# Patient Record
Sex: MALE | Race: WHITE | NOT HISPANIC OR LATINO | Employment: OTHER | URBAN - METROPOLITAN AREA
[De-identification: names, ages, dates, MRNs, and addresses within clinical notes are randomized per-mention and may not be internally consistent; named-entity substitution may affect disease eponyms.]

---

## 2024-07-25 ENCOUNTER — OFFICE VISIT (OUTPATIENT)
Dept: FAMILY MEDICINE CLINIC | Facility: CLINIC | Age: 79
End: 2024-07-25
Payer: MEDICARE

## 2024-07-25 VITALS
HEART RATE: 78 BPM | TEMPERATURE: 97.5 F | DIASTOLIC BLOOD PRESSURE: 78 MMHG | WEIGHT: 184 LBS | OXYGEN SATURATION: 98 % | SYSTOLIC BLOOD PRESSURE: 128 MMHG | BODY MASS INDEX: 26.34 KG/M2 | HEIGHT: 70 IN

## 2024-07-25 DIAGNOSIS — Z00.00 ANNUAL PHYSICAL EXAM: Primary | ICD-10-CM

## 2024-07-25 DIAGNOSIS — M79.671 PAIN IN BOTH FEET: ICD-10-CM

## 2024-07-25 DIAGNOSIS — M79.672 PAIN IN BOTH FEET: ICD-10-CM

## 2024-07-25 DIAGNOSIS — Z79.4 TYPE 2 DIABETES MELLITUS WITH HYPERGLYCEMIA, WITH LONG-TERM CURRENT USE OF INSULIN: ICD-10-CM

## 2024-07-25 DIAGNOSIS — I25.10 CORONARY ARTERY DISEASE INVOLVING NATIVE CORONARY ARTERY OF NATIVE HEART WITHOUT ANGINA PECTORIS: ICD-10-CM

## 2024-07-25 DIAGNOSIS — Z87.891 PERSONAL HISTORY OF TOBACCO USE, PRESENTING HAZARDS TO HEALTH: ICD-10-CM

## 2024-07-25 DIAGNOSIS — E78.2 MIXED HYPERLIPIDEMIA: ICD-10-CM

## 2024-07-25 DIAGNOSIS — I10 PRIMARY HYPERTENSION: ICD-10-CM

## 2024-07-25 DIAGNOSIS — J43.9 PULMONARY EMPHYSEMA, UNSPECIFIED EMPHYSEMA TYPE: ICD-10-CM

## 2024-07-25 DIAGNOSIS — E11.65 TYPE 2 DIABETES MELLITUS WITH HYPERGLYCEMIA, WITH LONG-TERM CURRENT USE OF INSULIN: ICD-10-CM

## 2024-07-25 DIAGNOSIS — E53.8 VITAMIN B12 DEFICIENCY: ICD-10-CM

## 2024-07-25 PROBLEM — N40.0 BENIGN PROSTATIC HYPERPLASIA WITHOUT LOWER URINARY TRACT SYMPTOMS: Status: ACTIVE | Noted: 2024-07-25

## 2024-07-25 PROBLEM — M79.2 NEUROPATHIC PAIN: Status: ACTIVE | Noted: 2024-07-25

## 2024-07-25 PROBLEM — J44.9 COPD (CHRONIC OBSTRUCTIVE PULMONARY DISEASE): Status: ACTIVE | Noted: 2024-07-25

## 2024-07-25 LAB
ALBUMIN SERPL-MCNC: 4 G/DL (ref 3.5–5.2)
ALBUMIN UR-MCNC: <1.2 MG/DL
ALBUMIN/GLOB SERPL: 1.5 G/DL
ALP SERPL-CCNC: 70 U/L (ref 39–117)
ALT SERPL W P-5'-P-CCNC: 27 U/L (ref 1–41)
ANION GAP SERPL CALCULATED.3IONS-SCNC: 11.4 MMOL/L (ref 5–15)
AST SERPL-CCNC: 25 U/L (ref 1–40)
BASOPHILS # BLD AUTO: 0.05 10*3/MM3 (ref 0–0.2)
BASOPHILS NFR BLD AUTO: 0.5 % (ref 0–1.5)
BILIRUB SERPL-MCNC: 0.4 MG/DL (ref 0–1.2)
BUN SERPL-MCNC: 13 MG/DL (ref 8–23)
BUN/CREAT SERPL: 15.1 (ref 7–25)
CALCIUM SPEC-SCNC: 8.5 MG/DL (ref 8.6–10.5)
CHLORIDE SERPL-SCNC: 106 MMOL/L (ref 98–107)
CHOLEST SERPL-MCNC: 103 MG/DL (ref 0–200)
CO2 SERPL-SCNC: 23.6 MMOL/L (ref 22–29)
CREAT SERPL-MCNC: 0.86 MG/DL (ref 0.76–1.27)
CREAT UR-MCNC: 87.4 MG/DL
DEPRECATED RDW RBC AUTO: 48.1 FL (ref 37–54)
EGFRCR SERPLBLD CKD-EPI 2021: 89.7 ML/MIN/1.73
EOSINOPHIL # BLD AUTO: 0.15 10*3/MM3 (ref 0–0.4)
EOSINOPHIL NFR BLD AUTO: 1.4 % (ref 0.3–6.2)
ERYTHROCYTE [DISTWIDTH] IN BLOOD BY AUTOMATED COUNT: 13.2 % (ref 12.3–15.4)
GLOBULIN UR ELPH-MCNC: 2.6 GM/DL
GLUCOSE SERPL-MCNC: 121 MG/DL (ref 65–99)
HBA1C MFR BLD: 6.3 % (ref 4.8–5.6)
HCT VFR BLD AUTO: 47.8 % (ref 37.5–51)
HCV AB SER QL: NORMAL
HDLC SERPL-MCNC: 48 MG/DL (ref 40–60)
HGB BLD-MCNC: 16 G/DL (ref 13–17.7)
IMM GRANULOCYTES # BLD AUTO: 0.04 10*3/MM3 (ref 0–0.05)
IMM GRANULOCYTES NFR BLD AUTO: 0.4 % (ref 0–0.5)
LDLC SERPL CALC-MCNC: 42 MG/DL (ref 0–100)
LDLC/HDLC SERPL: 0.92 {RATIO}
LYMPHOCYTES # BLD AUTO: 1.56 10*3/MM3 (ref 0.7–3.1)
LYMPHOCYTES NFR BLD AUTO: 15 % (ref 19.6–45.3)
MCH RBC QN AUTO: 33.5 PG (ref 26.6–33)
MCHC RBC AUTO-ENTMCNC: 33.5 G/DL (ref 31.5–35.7)
MCV RBC AUTO: 100 FL (ref 79–97)
MICROALBUMIN/CREAT UR: NORMAL MG/G{CREAT}
MONOCYTES # BLD AUTO: 0.84 10*3/MM3 (ref 0.1–0.9)
MONOCYTES NFR BLD AUTO: 8.1 % (ref 5–12)
NEUTROPHILS NFR BLD AUTO: 7.75 10*3/MM3 (ref 1.7–7)
NEUTROPHILS NFR BLD AUTO: 74.6 % (ref 42.7–76)
NRBC BLD AUTO-RTO: 0 /100 WBC (ref 0–0.2)
PLATELET # BLD AUTO: 150 10*3/MM3 (ref 140–450)
PMV BLD AUTO: 10.9 FL (ref 6–12)
POTASSIUM SERPL-SCNC: 4.2 MMOL/L (ref 3.5–5.2)
PROT SERPL-MCNC: 6.6 G/DL (ref 6–8.5)
RBC # BLD AUTO: 4.78 10*6/MM3 (ref 4.14–5.8)
SODIUM SERPL-SCNC: 141 MMOL/L (ref 136–145)
TRIGL SERPL-MCNC: 54 MG/DL (ref 0–150)
TSH SERPL DL<=0.05 MIU/L-ACNC: 0.88 UIU/ML (ref 0.27–4.2)
VIT B12 BLD-MCNC: 530 PG/ML (ref 211–946)
VLDLC SERPL-MCNC: 13 MG/DL (ref 5–40)
WBC NRBC COR # BLD AUTO: 10.39 10*3/MM3 (ref 3.4–10.8)

## 2024-07-25 PROCEDURE — 80061 LIPID PANEL: CPT | Performed by: FAMILY MEDICINE

## 2024-07-25 PROCEDURE — G0439 PPPS, SUBSEQ VISIT: HCPCS | Performed by: FAMILY MEDICINE

## 2024-07-25 PROCEDURE — 86803 HEPATITIS C AB TEST: CPT | Performed by: FAMILY MEDICINE

## 2024-07-25 PROCEDURE — 3078F DIAST BP <80 MM HG: CPT | Performed by: FAMILY MEDICINE

## 2024-07-25 PROCEDURE — 82570 ASSAY OF URINE CREATININE: CPT | Performed by: FAMILY MEDICINE

## 2024-07-25 PROCEDURE — 1160F RVW MEDS BY RX/DR IN RCRD: CPT | Performed by: FAMILY MEDICINE

## 2024-07-25 PROCEDURE — 85025 COMPLETE CBC W/AUTO DIFF WBC: CPT | Performed by: FAMILY MEDICINE

## 2024-07-25 PROCEDURE — 1159F MED LIST DOCD IN RCRD: CPT | Performed by: FAMILY MEDICINE

## 2024-07-25 PROCEDURE — 83036 HEMOGLOBIN GLYCOSYLATED A1C: CPT | Performed by: FAMILY MEDICINE

## 2024-07-25 PROCEDURE — 36415 COLL VENOUS BLD VENIPUNCTURE: CPT | Performed by: FAMILY MEDICINE

## 2024-07-25 PROCEDURE — 82043 UR ALBUMIN QUANTITATIVE: CPT | Performed by: FAMILY MEDICINE

## 2024-07-25 PROCEDURE — 1170F FXNL STATUS ASSESSED: CPT | Performed by: FAMILY MEDICINE

## 2024-07-25 PROCEDURE — 80053 COMPREHEN METABOLIC PANEL: CPT | Performed by: FAMILY MEDICINE

## 2024-07-25 PROCEDURE — 1125F AMNT PAIN NOTED PAIN PRSNT: CPT | Performed by: FAMILY MEDICINE

## 2024-07-25 PROCEDURE — 82607 VITAMIN B-12: CPT | Performed by: FAMILY MEDICINE

## 2024-07-25 PROCEDURE — 84443 ASSAY THYROID STIM HORMONE: CPT | Performed by: FAMILY MEDICINE

## 2024-07-25 PROCEDURE — 3074F SYST BP LT 130 MM HG: CPT | Performed by: FAMILY MEDICINE

## 2024-07-25 RX ORDER — INSULIN DEGLUDEC 100 U/ML
INJECTION, SOLUTION SUBCUTANEOUS
COMMUNITY
Start: 2024-06-04

## 2024-07-25 RX ORDER — CYANOCOBALAMIN 1000 UG/ML
INJECTION, SOLUTION INTRAMUSCULAR; SUBCUTANEOUS
COMMUNITY
Start: 2024-06-11

## 2024-07-25 RX ORDER — PEN NEEDLE, DIABETIC 32GX 5/32"
NEEDLE, DISPOSABLE MISCELLANEOUS
COMMUNITY
Start: 2024-05-17

## 2024-07-25 RX ORDER — SACUBITRIL AND VALSARTAN 97; 103 MG/1; MG/1
TABLET, FILM COATED ORAL
COMMUNITY
Start: 2024-06-25

## 2024-07-25 RX ORDER — BLOOD-GLUCOSE METER
KIT MISCELLANEOUS
COMMUNITY
Start: 2024-06-24

## 2024-07-25 RX ORDER — FINASTERIDE 5 MG/1
TABLET, FILM COATED ORAL
COMMUNITY
Start: 2024-06-17

## 2024-07-25 RX ORDER — ATORVASTATIN CALCIUM 80 MG/1
TABLET, FILM COATED ORAL
COMMUNITY
Start: 2024-04-29

## 2024-07-25 RX ORDER — FLUTICASONE FUROATE, UMECLIDINIUM BROMIDE AND VILANTEROL TRIFENATATE 100; 62.5; 25 UG/1; UG/1; UG/1
POWDER RESPIRATORY (INHALATION)
COMMUNITY
Start: 2024-06-30

## 2024-07-25 RX ORDER — EMPAGLIFLOZIN 25 MG/1
TABLET, FILM COATED ORAL
COMMUNITY
Start: 2024-06-03

## 2024-07-25 RX ORDER — SEMAGLUTIDE 1.34 MG/ML
INJECTION, SOLUTION SUBCUTANEOUS
COMMUNITY
Start: 2024-05-24

## 2024-07-25 RX ORDER — OXYBUTYNIN CHLORIDE 5 MG/1
TABLET, EXTENDED RELEASE ORAL
COMMUNITY
Start: 2024-07-20

## 2024-07-25 RX ORDER — CLOPIDOGREL BISULFATE 75 MG/1
TABLET ORAL
COMMUNITY
Start: 2024-05-24

## 2024-07-25 RX ORDER — GABAPENTIN 300 MG/1
CAPSULE ORAL
COMMUNITY
Start: 2024-04-16

## 2024-07-25 RX ORDER — TAMSULOSIN HYDROCHLORIDE 0.4 MG/1
CAPSULE ORAL
COMMUNITY
Start: 2024-06-01

## 2024-07-25 RX ORDER — LANCETS 28 GAUGE
EACH MISCELLANEOUS
COMMUNITY
Start: 2024-06-24

## 2024-07-25 RX ORDER — METOPROLOL SUCCINATE 100 MG/1
TABLET, EXTENDED RELEASE ORAL
COMMUNITY
Start: 2024-06-17

## 2024-07-25 RX ORDER — ALBUTEROL SULFATE 90 UG/1
AEROSOL, METERED RESPIRATORY (INHALATION)
COMMUNITY
Start: 2024-06-03

## 2024-07-25 NOTE — PROGRESS NOTES
"Chief Complaint  Establish Care (He forgot his medicine at home on the counter.  He is going to try to verbally give them to me. ) and Diabetes    Subjective      Josemanuel De La Fuente is a 78 y.o. male who presents to [facility]     Patient here to establish care.    PMH: Hypertension, hyperlipidemia, type 2 diabetes, BPH, neuropathy, COPD, vitamin B12 deficiency  SH:         Objective   Vital Signs:   Vitals:    07/25/24 1605   Pulse: 78   Temp: 97.5 °F (36.4 °C)   TempSrc: Temporal   SpO2: 98%   Weight: 83.5 kg (184 lb)   Height: 178.4 cm (70.25\")   PainSc:   6     Body mass index is 26.21 kg/m².    Wt Readings from Last 3 Encounters:   07/25/24 83.5 kg (184 lb)     BP Readings from Last 3 Encounters:   No data found for BP       Health Maintenance   Topic Date Due    TDAP/TD VACCINES (1 - Tdap) Never done    ZOSTER VACCINE (1 of 2) Never done    RSV Vaccine - Adults (1 - 1-dose 60+ series) Never done    Pneumococcal Vaccine 65+ (1 of 1 - PCV) Never done    COVID-19 Vaccine (1 - 2023-24 season) Never done    HEPATITIS C SCREENING  Never done    ANNUAL PHYSICAL  Never done    INFLUENZA VACCINE  08/01/2024       Physical Exam     Result Review :  The following data was reviewed by: Tino Roach MD on 07/25/2024:  {[facility] Ambulatory Labs (Optional):13646}  {Data reviewed (Optional):43487:::1}     Procedures     {DME w/ Necessity Statements (Optional):67966}     Assessment & Plan  Annual physical exam               {BMI is >= 25 and <30. (Overweight) The following options were offered after discussion;:[id number]}       {Time Spent (Optional):00787}  FOLLOW UP  No follow-ups on file.  Patient was given instructions and counseling regarding his condition or for health maintenance advice. Please see specific information pulled into the AVS if appropriate.       Tino Roach MD  07/25/24  16:07 EDT    CURRENT & DISCONTINUED MEDICATIONS  No current outpatient " medications    There are no discontinued medications.        The patient is a 38y Female complaining of abdominal pain.

## 2024-07-25 NOTE — PATIENT INSTRUCTIONS
Please review the decision aid used during our discussion regarding the Low dose lung cancer screening visit today.                          For more information:    Quit Now [place]  1-800-QUIT-NOW  https://[place].quitlogix.org/en-[place]/  Steps to Quit Smoking  Smoking tobacco can be harmful to your health and can affect almost every organ in your body. Smoking puts you, and those around you, at risk for developing many serious chronic diseases. Quitting smoking is difficult, but it is one of the best things that you can do for your health. It is never too late to quit.  What are the benefits of quitting smoking?  When you quit smoking, you lower your risk of developing serious diseases and conditions, such as:  Lung cancer or lung disease, such as COPD.  Heart disease.  Stroke.  Heart attack.  Infertility.  Osteoporosis and bone fractures.  Additionally, symptoms such as coughing, wheezing, and shortness of breath may get better when you quit. You may also find that you get sick less often because your body is stronger at fighting off colds and infections. If you are pregnant, quitting smoking can help to reduce your chances of having a baby of low birth weight.  How do I get ready to quit?  When you decide to quit smoking, create a plan to make sure that you are successful. Before you quit:  Pick a date to quit. Set a date within the next two weeks to give you time to prepare.  Write down the reasons why you are quitting. Keep this list in places where you will see it often, such as on your bathroom mirror or in your car or wallet.  Identify the people, places, things, and activities that make you want to smoke (triggers) and avoid them. Make sure to take these actions:  Throw away all cigarettes at home, at work, and in your car.  Throw away smoking accessories, such as ashtrays and lighters.  Clean your car and make sure to empty the ashtray.  Clean your home, including curtains and carpets.  Tell your family,  friends, and coworkers that you are quitting. Support from your loved ones can make quitting easier.  Talk with your health care provider about your options for quitting smoking.  Find out what treatment options are covered by your health insurance.  What strategies can I use to quit smoking?  Talk with your healthcare provider about different strategies to quit smoking. Some strategies include:  Quitting smoking altogether instead of gradually lessening how much you smoke over a period of time. Research shows that quitting “cold turkey” is more successful than gradually quitting.  Attending in-person counseling to help you build problem-solving skills. You are more likely to have success in quitting if you attend several counseling sessions. Even short sessions of 10 minutes can be effective.  Finding resources and support systems that can help you to quit smoking and remain smoke-free after you quit. These resources are most helpful when you use them often. They can include:  Online chats with a counselor.  Telephone quitlines.  Printed self-help materials.  Support groups or group counseling.  Text messaging programs.  Mobile phone applications.  Taking medicines to help you quit smoking. (If you are pregnant or breastfeeding, talk with your health care provider first.) Some medicines contain nicotine and some do not. Both types of medicines help with cravings, but the medicines that include nicotine help to relieve withdrawal symptoms. Your health care provider may recommend:  Nicotine patches, gum, or lozenges.  Nicotine inhalers or sprays.  Non-nicotine medicine that is taken by mouth.  Talk with your health care provider about combining strategies, such as taking medicines while you are also receiving in-person counseling. Using these two strategies together makes you more likely to succeed in quitting than if you used either strategy on its own.  If you are pregnant or breastfeeding, talk with your health  care provider about finding counseling or other support strategies to quit smoking. Do not take medicine to help you quit smoking unless told to do so by your health care provider.  What things can I do to make it easier to quit?  Quitting smoking might feel overwhelming at first, but there is a lot that you can do to make it easier. Take these important actions:  Reach out to your family and friends and ask that they support and encourage you during this time. Call telephone quitlines, reach out to support groups, or work with a counselor for support.  Ask people who smoke to avoid smoking around you.  Avoid places that trigger you to smoke, such as bars, parties, or smoke-break areas at work.  Spend time around people who do not smoke.  Lessen stress in your life, because stress can be a smoking trigger for some people. To lessen stress, try:  Exercising regularly.  Deep-breathing exercises.  Yoga.  Meditating.  Performing a body scan. This involves closing your eyes, scanning your body from head to toe, and noticing which parts of your body are particularly tense. Purposefully relax the muscles in those areas.  Download or purchase mobile phone or tablet apps (applications) that can help you stick to your quit plan by providing reminders, tips, and encouragement. There are many free apps, such as QuitGuide from the CDC (Centers for Disease Control and Prevention). You can find other support for quitting smoking (smoking cessation) through smokefree.gov and other websites.  How will I feel when I quit smoking?  Within the first 24 hours of quitting smoking, you may start to feel some withdrawal symptoms. These symptoms are usually most noticeable 2-3 days after quitting, but they usually do not last beyond 2-3 weeks. Changes or symptoms that you might experience include:  Mood swings.  Restlessness, anxiety, or irritation.  Difficulty concentrating.  Dizziness.  Strong cravings for sugary foods in addition to  nicotine.  Mild weight gain.  Constipation.  Nausea.  Coughing or a sore throat.  Changes in how your medicines work in your body.  A depressed mood.  Difficulty sleeping (insomnia).  After the first 2-3 weeks of quitting, you may start to notice more positive results, such as:  Improved sense of smell and taste.  Decreased coughing and sore throat.  Slower heart rate.  Lower blood pressure.  Clearer skin.  The ability to breathe more easily.  Fewer sick days.  Quitting smoking is very challenging for most people. Do not get discouraged if you are not successful the first time. Some people need to make many attempts to quit before they achieve long-term success. Do your best to stick to your quit plan, and talk with your health care provider if you have any questions or concerns.  This information is not intended to replace advice given to you by your health care provider. Make sure you discuss any questions you have with your health care provider.  Document Released: 12/12/2002 Document Revised: 08/15/2017 Document Reviewed: 05/03/2016  [employer] Interactive Patient Education © 2017 [employer]

## 2024-07-25 NOTE — PROGRESS NOTES
Subjective   The ABCs of the Annual Wellness Visit  Medicare Wellness Visit    Patient here to establish care. Recently moved here from [place].    PMH: CAD on plavix, Hypertension, hyperlipidemia, type 2 diabetes, BPH, neuropathy, COPD, vitamin B12 deficiency  SH: Current smoker, 1 ppd for 61+ years. No alcohol or drug use.      Josemanuel De La Fuente is a 78 y.o. patient who presents for a Medicare Wellness Visit.    The following portions of the patient's history were reviewed and   updated as appropriate: allergies, current medications, past family history, past medical history, past social history, past surgical history, and problem list.    Compared to one year ago, the patient's physical   health is the same.  Compared to one year ago, the patient's mental   health is the same.    Recent Hospitalizations:  He was not admitted to the hospital during the last year.     Current Medical Providers:  Patient Care Team:  Tino Roach MD as PCP - General (Family Medicine)  Cardiology - followed a physician down in [place], referring to new Cardiologist  Podiatry - followed one down in [place], referring to new Podiatrist    Outpatient Medications Prior to Visit   Medication Sig Dispense Refill    albuterol sulfate  (90 Base) MCG/ACT inhaler       atorvastatin (LIPITOR) 80 MG tablet       BD Pen Needle Nubia U/F 32G X 4 MM misc       clopidogrel (PLAVIX) 75 MG tablet       cyanocobalamin 1000 MCG/ML injection       Entresto  MG tablet       finasteride (PROSCAR) 5 MG tablet       FREESTYLE LITE test strip       Jardiance 25 MG tablet tablet       Lancets (freestyle) lancets       metoprolol succinate XL (TOPROL-XL) 100 MG 24 hr tablet       oxybutynin XL (DITROPAN-XL) 5 MG 24 hr tablet       Ozempic, 1 MG/DOSE, 4 MG/3ML solution pen-injector       tamsulosin (FLOMAX) 0.4 MG capsule 24 hr capsule       Trelegy Ellipta 100-62.5-25 MCG/ACT inhaler       Tresiba FlexTouch 100 UNIT/ML solution pen-injector  "injection       gabapentin (NEURONTIN) 300 MG capsule        No facility-administered medications prior to visit.     No opioid medication identified on active medication list. I have reviewed chart for other potential  high risk medication/s and harmful drug interactions in the elderly.      Aspirin is not on active medication list.  Aspirin use is not indicated based on review of current medical condition/s. Risk of harm outweighs potential benefits.  .    Patient Active Problem List   Diagnosis    Primary hypertension    Mixed hyperlipidemia    Type 2 diabetes mellitus with hyperglycemia, with long-term current use of insulin    Vitamin B12 deficiency    Benign prostatic hyperplasia without lower urinary tract symptoms    Neuropathic pain    COPD (chronic obstructive pulmonary disease)    Coronary artery disease involving native coronary artery of native heart without angina pectoris     Advance Care Planning (Click this link to access ACP Navigator)  Advance Directive is not on file.  ACP discussion was held with the patient during this visit. Patient does not have an advance directive, information provided.        Objective   Vitals:    07/25/24 1605   BP: 128/78   Pulse: 78   Temp: 97.5 °F (36.4 °C)   TempSrc: Temporal   SpO2: 98%   Weight: 83.5 kg (184 lb)   Height: 178.4 cm (70.25\")   PainSc:   6       Estimated body mass index is 26.21 kg/m² as calculated from the following:    Height as of this encounter: 178.4 cm (70.25\").    Weight as of this encounter: 83.5 kg (184 lb).    BMI is >= 25 and <30. (Overweight) The following options were offered after discussion;: information on healthy weight added to patient's after visit summary         Does the patient have evidence of cognitive impairment? No                                                                                               Health  Risk Assessment    Smoking Status:  Social History     Tobacco Use   Smoking Status Every Day    Current " packs/day: 1.00    Average packs/day: 1 pack/day for 61.9 years (61.9 ttl pk-yrs)    Types: Cigarettes    Start date: 9/15/1962    Passive exposure: Current   Smokeless Tobacco Never     Alcohol Consumption:  Social History     Substance and Sexual Activity   Alcohol Use Not Currently     Fall Risk Screen:  YOCASTA Fall Risk Assessment was completed, and patient is at LOW risk for falls.Assessment completed on:2024    Depression Screenin/25/2024     4:20 PM   PHQ-2/PHQ-9 Depression Screening   Little Interest or Pleasure in Doing Things 0-->not at all   Feeling Down, Depressed or Hopeless 1-->several days   PHQ-9: Brief Depression Severity Measure Score 1     Health Habits and Functional and Cognitive Screenin/25/2024     4:00 PM   Functional & Cognitive Status   Do you have difficulty preparing food and eating? No   Do you have difficulty bathing yourself, getting dressed or grooming yourself? No   Do you have difficulty using the toilet? No   Do you have difficulty moving around from place to place? No   Do you have trouble with steps or getting out of a bed or a chair? No   Current Diet Diabetic Diet   Dental Exam Other   Eye Exam Up to date   Exercise (times per week) 7 times per week   Current Exercises Include Yard Work   Do you need help using the phone?  No   Are you deaf or do you have serious difficulty hearing?  Yes   Do you need help to go to places out of walking distance? No   Do you need help shopping? No   Do you need help preparing meals?  No   Do you need help with housework?  No   Do you need help with laundry? No   Do you need help taking your medications? No   Do you need help managing money? No   Do you ever drive or ride in a car without wearing a seat belt? No   Have you felt unusual stress, anger or loneliness in the last month? Yes   Who do you live with? Alone   If you need help, do you have trouble finding someone available to you? No   Have you been bothered in the  last four weeks by sexual problems? No   Do you have difficulty concentrating, remembering or making decisions? Yes             Age-appropriate Screening Schedule:  Refer to the list below for future screening recommendations based on patient's age, sex and/or medical conditions. Orders for these recommended tests are listed in the plan section. The patient has been provided with a written plan.    Health Maintenance List  Health Maintenance   Topic Date Due    LIPID PANEL  Never done    URINE MICROALBUMIN  Never done    Pneumococcal Vaccine 65+ (1 of 2 - PCV) Never done    DIABETIC EYE EXAM  Never done    TDAP/TD VACCINES (1 - Tdap) Never done    ZOSTER VACCINE (1 of 2) Never done    LUNG CANCER SCREENING  Never done    RSV Vaccine - Adults (1 - 1-dose 60+ series) Never done    COVID-19 Vaccine (1 - 2023-24 season) Never done    HEPATITIS C SCREENING  Never done    HEMOGLOBIN A1C  Never done    INFLUENZA VACCINE  08/01/2024    BMI FOLLOWUP  05/24/2025    ANNUAL WELLNESS VISIT  07/25/2025                                                                                                                                                CMS Preventative Services Quick Reference  Risk Factors Identified During Encounter  None Identified    The above risks/problems have been discussed with the patient.  Pertinent information has been shared with the patient in the After Visit Summary.  An After Visit Summary and PPPS were made available to the patient.    Follow Up:   Next Medicare Wellness visit to be scheduled in 1 year.     Assessment & Plan  Annual physical exam  Annual labs ordered today. Reviewed medication list.  Type 2 diabetes mellitus with hyperglycemia, with long-term current use of insulin  Check diabetes labs today.  Primary hypertension  Hypertension is stable and controlled  Continue current treatment regimen.  Blood pressure will be reassessed in 6 months.  Mixed hyperlipidemia    Check lipids today.    Coronary artery disease involving native coronary artery of native heart without angina pectoris  Referring to Cardiology for history of CAD and HTN.  Vitamin B12 deficiency  Check B12 level today  Personal history of tobacco use, presenting hazards to health  LDCT scan ordered. Recommended smoking cessation  Pain in both feet  Referred to podiatry    Orders Placed This Encounter   Procedures    CT chest low dose wo    Comprehensive Metabolic Panel    Hemoglobin A1c    Lipid Panel    Hepatitis C Antibody    Microalbumin / Creatinine Urine Ratio - Urine, Clean Catch    TSH Rfx On Abnormal To Free T4    Vitamin B12    Ambulatory Referral to Cardiology    Ambulatory Referral to Podiatry    CBC & Differential             Follow Up:   Return in about 6 months (around 1/25/2025), or if symptoms worsen or fail to improve, for Recheck.

## 2024-07-25 NOTE — PROGRESS NOTES
Venipuncture Blood Specimen Collection  Venipuncture performed in LEFT ARM  by Kimberly Castelan with good hemostasis. Patient tolerated the procedure well without complications.   07/25/24   Kimberly Castelan

## 2024-08-05 ENCOUNTER — HOSPITAL ENCOUNTER (OUTPATIENT)
Dept: CT IMAGING | Facility: HOSPITAL | Age: 79
Discharge: HOME | End: 2024-08-05
Admitting: FAMILY MEDICINE
Payer: MEDICARE

## 2024-08-05 DIAGNOSIS — Z87.891 PERSONAL HISTORY OF TOBACCO USE, PRESENTING HAZARDS TO HEALTH: ICD-10-CM

## 2024-08-05 PROCEDURE — 71271 CT THORAX LUNG CANCER SCR C-: CPT

## 2024-08-13 ENCOUNTER — OFFICE VISIT (OUTPATIENT)
Dept: FAMILY MEDICINE CLINIC | Facility: CLINIC | Age: 79
End: 2024-08-13
Payer: MEDICARE

## 2024-08-13 VITALS
SYSTOLIC BLOOD PRESSURE: 124 MMHG | WEIGHT: 182 LBS | HEART RATE: 76 BPM | OXYGEN SATURATION: 99 % | BODY MASS INDEX: 25.93 KG/M2 | DIASTOLIC BLOOD PRESSURE: 68 MMHG

## 2024-08-13 DIAGNOSIS — L97.519 ULCER OF TOE OF RIGHT FOOT, UNSPECIFIED ULCER STAGE: ICD-10-CM

## 2024-08-13 DIAGNOSIS — M79.671 RIGHT FOOT PAIN: Primary | ICD-10-CM

## 2024-08-13 DIAGNOSIS — R29.6 FALLS FREQUENTLY: ICD-10-CM

## 2024-08-13 DIAGNOSIS — N40.0 BENIGN PROSTATIC HYPERPLASIA WITHOUT LOWER URINARY TRACT SYMPTOMS: ICD-10-CM

## 2024-08-13 DIAGNOSIS — J43.9 PULMONARY EMPHYSEMA, UNSPECIFIED EMPHYSEMA TYPE: ICD-10-CM

## 2024-08-13 DIAGNOSIS — R26.81 UNSTEADY GAIT: ICD-10-CM

## 2024-08-13 PROCEDURE — 1160F RVW MEDS BY RX/DR IN RCRD: CPT | Performed by: FAMILY MEDICINE

## 2024-08-13 PROCEDURE — 3074F SYST BP LT 130 MM HG: CPT | Performed by: FAMILY MEDICINE

## 2024-08-13 PROCEDURE — 3078F DIAST BP <80 MM HG: CPT | Performed by: FAMILY MEDICINE

## 2024-08-13 PROCEDURE — 1159F MED LIST DOCD IN RCRD: CPT | Performed by: FAMILY MEDICINE

## 2024-08-13 PROCEDURE — 1125F AMNT PAIN NOTED PAIN PRSNT: CPT | Performed by: FAMILY MEDICINE

## 2024-08-13 PROCEDURE — 99214 OFFICE O/P EST MOD 30 MIN: CPT | Performed by: FAMILY MEDICINE

## 2024-08-13 RX ORDER — FLUTICASONE FUROATE, UMECLIDINIUM BROMIDE AND VILANTEROL TRIFENATATE 100; 62.5; 25 UG/1; UG/1; UG/1
1 POWDER RESPIRATORY (INHALATION)
Qty: 60 EACH | Refills: 2 | Status: SHIPPED | OUTPATIENT
Start: 2024-08-13 | End: 2025-02-09

## 2024-08-13 RX ORDER — SULFAMETHOXAZOLE AND TRIMETHOPRIM 800; 160 MG/1; MG/1
1 TABLET ORAL 2 TIMES DAILY
Qty: 14 TABLET | Refills: 0 | Status: SHIPPED | OUTPATIENT
Start: 2024-08-13 | End: 2024-08-20

## 2024-08-13 NOTE — PROGRESS NOTES
Chief Complaint  Foot Pain (Rt foot pain and numb/when swollen pt is off balance/Also needing referral for urology ([phone])previous urologist)    Subjective      Josemanuel De La Fuente is a 78 y.o. male who presents to [facility]     BPH.  Wants referral to urology.    Right foot pain and numbness.  Has an upcoming appointment with podiatry on 9/12/2024. Has known neuropathy in that right foot. Gets aches and pains in that foot.     Has an ulcer on the right 3rd toe. He was unaware of this until I looked at his feet.    Frequent falls and unsteadiness.     Objective   Vital Signs:   Vitals:    08/13/24 1338   BP: 124/68   Pulse: 76   SpO2: 99%   Weight: 82.6 kg (182 lb)     Body mass index is 25.93 kg/m².    Wt Readings from Last 3 Encounters:   08/13/24 82.6 kg (182 lb)   07/25/24 83.5 kg (184 lb)     BP Readings from Last 3 Encounters:   08/13/24 124/68   07/25/24 128/78       Health Maintenance   Topic Date Due    Pneumococcal Vaccine 65+ (1 of 2 - PCV) Never done    DIABETIC EYE EXAM  Never done    TDAP/TD VACCINES (1 - Tdap) Never done    ZOSTER VACCINE (1 of 2) Never done    RSV Vaccine - Adults (1 - 1-dose 60+ series) Never done    COVID-19 Vaccine (1 - 2023-24 season) Never done    INFLUENZA VACCINE  08/01/2024    HEMOGLOBIN A1C  01/25/2025    ANNUAL WELLNESS VISIT  07/25/2025    LIPID PANEL  07/25/2025    URINE MICROALBUMIN  07/25/2025    BMI FOLLOWUP  07/25/2025    LUNG CANCER SCREENING  08/05/2025    HEPATITIS C SCREENING  Completed       Physical Exam  Vitals and nursing note reviewed.   Constitutional:       General: He is not in acute distress.     Comments: Ambulates with cane   Cardiovascular:      Rate and Rhythm: Normal rate and regular rhythm.      Heart sounds: Normal heart sounds.   Pulmonary:      Effort: Pulmonary effort is normal. No respiratory distress.      Breath sounds: Normal breath sounds.   Feet:      Comments: Right 3rd tip/bottom of toe has ulcer  present with no bleeding or pus. All 10 toenails are thickened and discolored.  Neurological:      Mental Status: He is alert.   Psychiatric:         Mood and Affect: Mood normal.         Behavior: Behavior normal.          Result Review :  The following data was reviewed by: Tino Roach MD on 08/13/2024:         Procedures          Assessment & Plan  Right foot pain    Benign prostatic hyperplasia without lower urinary tract symptoms  Reerred to Urology - has previously followed a Urologist.  Ulcer of toe of right foot, unspecified ulcer stage  Obtain x-ray in clinic today to check for osteomyelitis. Started him on 1 week course of Bactrim. Referred urgently to Podiatry for their wound management of toe wound.  I dressed the wound with triple antibiotic ointment and gauze with a bandage surrounding it.  Recommending continuing this dressing and changing daily.  Pulmonary emphysema, unspecified emphysema type  Refilled Trelegy inhaler    Falls frequently  Referred to OT for evaluation and treatment. He feels uncomfortable getting into and out of the shower.  Agree with letter for mail service as detailed below.  Unsteady gait      Orders Placed This Encounter   Procedures    XR Foot 3+ View Right (In Office)    Ambulatory Referral to Podiatry    Ambulatory Referral to Urology    Ambulatory Referral to Occupational Therapy for Evaluation & Treatment     New Medications Ordered This Visit   Medications    sulfamethoxazole-trimethoprim (BACTRIM DS,SEPTRA DS) 800-160 MG per tablet     Sig: Take 1 tablet by mouth 2 (Two) Times a Day for 7 days.     Dispense:  14 tablet     Refill:  0    Trelegy Ellipta 100-62.5-25 MCG/ACT inhaler     Sig: Inhale 1 puff Daily for 180 days.     Dispense:  60 each     Refill:  2        Patient reports significant difficulty in getting his mail and so he is requesting an exception to current delivery mode due to physical hardship.  He has an official form from the [place]  [employer].  He lives alone and has trouble with walking and balance.  The distance from his house to the mailbox at the end of the gravel driveway is over 950 feet roundtrip he reports.  I believe that with his ambulation difficulties and being a fall risk, it would benefit him to have his mail delivered to a location less potentially hazardous to him.            FOLLOW UP  Return if symptoms worsen or fail to improve.  Patient was given instructions and counseling regarding his condition or for health maintenance advice. Please see specific information pulled into the AVS if appropriate.       Tino Roach MD  08/13/24  14:53 EDT    CURRENT & DISCONTINUED MEDICATIONS  Current Outpatient Medications   Medication Instructions    albuterol sulfate  (90 Base) MCG/ACT inhaler     atorvastatin (LIPITOR) 80 MG tablet     BD Pen Needle Nubia U/F 32G X 4 MM misc     clopidogrel (PLAVIX) 75 MG tablet     cyanocobalamin 1000 MCG/ML injection     Entresto  MG tablet     finasteride (PROSCAR) 5 MG tablet     FREESTYLE LITE test strip     Jardiance 25 MG tablet tablet     Lancets (freestyle) lancets     metoprolol succinate XL (TOPROL-XL) 100 MG 24 hr tablet     oxybutynin XL (DITROPAN-XL) 5 MG 24 hr tablet     Ozempic, 1 MG/DOSE, 4 MG/3ML solution pen-injector     sulfamethoxazole-trimethoprim (BACTRIM DS,SEPTRA DS) 800-160 MG per tablet 1 tablet, Oral, 2 Times Daily    tamsulosin (FLOMAX) 0.4 MG capsule 24 hr capsule     Trelegy Ellipta 100-62.5-25 MCG/ACT inhaler 1 puff, Inhalation, Daily - RT    Tresiba FlexTouch 100 UNIT/ML solution pen-injector injection        Medications Discontinued During This Encounter   Medication Reason    gabapentin (NEURONTIN) 300 MG capsule     Trelegy Ellipta 100-62.5-25 MCG/ACT inhaler Reorder

## 2024-08-16 ENCOUNTER — TELEPHONE (OUTPATIENT)
Dept: FAMILY MEDICINE CLINIC | Facility: CLINIC | Age: 79
End: 2024-08-16

## 2024-08-16 ENCOUNTER — OFFICE VISIT (OUTPATIENT)
Dept: PODIATRY | Facility: CLINIC | Age: 79
End: 2024-08-16
Payer: MEDICARE

## 2024-08-16 VITALS
BODY MASS INDEX: 26.05 KG/M2 | HEIGHT: 70 IN | OXYGEN SATURATION: 98 % | TEMPERATURE: 97.3 F | WEIGHT: 182 LBS | DIASTOLIC BLOOD PRESSURE: 67 MMHG | SYSTOLIC BLOOD PRESSURE: 115 MMHG | HEART RATE: 72 BPM

## 2024-08-16 DIAGNOSIS — L97.519 ULCER OF RIGHT FOOT, UNSPECIFIED ULCER STAGE: ICD-10-CM

## 2024-08-16 DIAGNOSIS — E08.59 DIABETES MELLITUS DUE TO UNDERLYING CONDITION WITH OTHER CIRCULATORY COMPLICATIONS: ICD-10-CM

## 2024-08-16 DIAGNOSIS — M86.471 CHRONIC OSTEOMYELITIS OF RIGHT FOOT WITH DRAINING SINUS: Primary | ICD-10-CM

## 2024-08-17 NOTE — PROGRESS NOTES
[facility] - PODIATRY    Today's Date: 08/17/24    Patient Name: Josemanuel De La Fuente  MRN: [id number]  CSN: [id number]  PCP: Tino Roach MD,  Referring Provider: No ref. provider found    SUBJECTIVE     Chief Complaint   Patient presents with    Left Foot - Establish Care, Diabetes, Nail Problem    Right Foot - Establish Care, Diabetes, Foot Ulcer, Nail Problem     3rd toe, noticed 3 days ago, saw PCP    Xray done 8/13/24  Yesterday blood sugar 111     HPI: Josemanuel De La Fuente, a 78 y.o.male, presents to clinic.    Patient is a 78-year-old diabetic male presenting with an ulcer to his right third toe.  Patient says his PCP noticed it about 3 days ago.  It was a callus on his big toe when they removed it there is a big sore there.  Patient says he was unaware of this and it has been there likely for a long period of time.  He does not have good feeling in his feet.  Patient says the x-ray showed bone changes of osteomyelitis.  He has moved here recently and has been on his feet more than usual.    Past Medical History:   Diagnosis Date    Diabetes mellitus     Foot ulcer     GERD (gastroesophageal reflux disease)     Low back pain     Pneumonia     Seizures      Past Surgical History:   Procedure Laterality Date    ELBOW PROCEDURE Left     EYE SURGERY Bilateral     KNEE SURGERY Bilateral     TONSILLECTOMY       History reviewed. No pertinent family history.  Social History     Socioeconomic History    Marital status:    Tobacco Use    Smoking status: Every Day     Current packs/day: 1.00     Average packs/day: 1 pack/day for 61.9 years (61.9 ttl pk-yrs)     Types: Cigarettes     Start date: 9/15/1962     Passive exposure: Current    Smokeless tobacco: Never   Vaping Use    Vaping status: Never Used   Substance and Sexual Activity    Alcohol use: Not Currently    Drug use: Never    Sexual activity: Not Currently     Partners: Female     No Known Allergies  Current Outpatient Medications    Medication Sig Dispense Refill    albuterol sulfate  (90 Base) MCG/ACT inhaler       atorvastatin (LIPITOR) 80 MG tablet       BD Pen Needle Nubia U/F 32G X 4 MM misc       clopidogrel (PLAVIX) 75 MG tablet       cyanocobalamin 1000 MCG/ML injection       Entresto  MG tablet       finasteride (PROSCAR) 5 MG tablet       FREESTYLE LITE test strip       Jardiance 25 MG tablet tablet       Lancets (freestyle) lancets       metoprolol succinate XL (TOPROL-XL) 100 MG 24 hr tablet       oxybutynin XL (DITROPAN-XL) 5 MG 24 hr tablet       Ozempic, 1 MG/DOSE, 4 MG/3ML solution pen-injector       sulfamethoxazole-trimethoprim (BACTRIM DS,SEPTRA DS) 800-160 MG per tablet Take 1 tablet by mouth 2 (Two) Times a Day for 7 days. 14 tablet 0    tamsulosin (FLOMAX) 0.4 MG capsule 24 hr capsule       Trelegy Ellipta 100-62.5-25 MCG/ACT inhaler Inhale 1 puff Daily for 180 days. 60 each 2    Tresiba FlexTouch 100 UNIT/ML solution pen-injector injection        No current facility-administered medications for this visit.     Review of Systems   Skin:         Ulcer right foot   Neurological:  Positive for numbness.   All other systems reviewed and are negative.      OBJECTIVE     Vitals:    08/16/24 0951   BP: 115/67   Pulse: 72   Temp: 97.3 °F (36.3 °C)   SpO2: 98%       WBC   Date Value Ref Range Status   07/25/2024 10.39 3.40 - 10.80 10*3/mm3 Final     RBC   Date Value Ref Range Status   07/25/2024 4.78 4.14 - 5.80 10*6/mm3 Final     Hemoglobin   Date Value Ref Range Status   07/25/2024 16.0 13.0 - 17.7 g/dL Final     Hematocrit   Date Value Ref Range Status   07/25/2024 47.8 37.5 - 51.0 % Final     MCV   Date Value Ref Range Status   07/25/2024 100.0 (H) 79.0 - 97.0 fL Final     MCH   Date Value Ref Range Status   07/25/2024 33.5 (H) 26.6 - 33.0 pg Final     MCHC   Date Value Ref Range Status   07/25/2024 33.5 31.5 - 35.7 g/dL Final     RDW   Date Value Ref Range Status   07/25/2024 13.2 12.3 - 15.4 % Final     RDW-SD    Date Value Ref Range Status   07/25/2024 48.1 37.0 - 54.0 fl Final     MPV   Date Value Ref Range Status   07/25/2024 10.9 6.0 - 12.0 fL Final     Platelets   Date Value Ref Range Status   07/25/2024 150 140 - 450 10*3/mm3 Final     Neutrophil %   Date Value Ref Range Status   07/25/2024 74.6 42.7 - 76.0 % Final     Lymphocyte %   Date Value Ref Range Status   07/25/2024 15.0 (L) 19.6 - 45.3 % Final     Monocyte %   Date Value Ref Range Status   07/25/2024 8.1 5.0 - 12.0 % Final     Eosinophil %   Date Value Ref Range Status   07/25/2024 1.4 0.3 - 6.2 % Final     Basophil %   Date Value Ref Range Status   07/25/2024 0.5 0.0 - 1.5 % Final     Immature Grans %   Date Value Ref Range Status   07/25/2024 0.4 0.0 - 0.5 % Final     Neutrophils, Absolute   Date Value Ref Range Status   07/25/2024 7.75 (H) 1.70 - 7.00 10*3/mm3 Final     Lymphocytes, Absolute   Date Value Ref Range Status   07/25/2024 1.56 0.70 - 3.10 10*3/mm3 Final     Monocytes, Absolute   Date Value Ref Range Status   07/25/2024 0.84 0.10 - 0.90 10*3/mm3 Final     Eosinophils, Absolute   Date Value Ref Range Status   07/25/2024 0.15 0.00 - 0.40 10*3/mm3 Final     Basophils, Absolute   Date Value Ref Range Status   07/25/2024 0.05 0.00 - 0.20 10*3/mm3 Final     Immature Grans, Absolute   Date Value Ref Range Status   07/25/2024 0.04 0.00 - 0.05 10*3/mm3 Final     nRBC   Date Value Ref Range Status   07/25/2024 0.0 0.0 - 0.2 /100 WBC Final         Lab Results   Component Value Date    GLUCOSE 121 (H) 07/25/2024    BUN 13 07/25/2024    CREATININE 0.86 07/25/2024    BCR 15.1 07/25/2024    K 4.2 07/25/2024    CO2 23.6 07/25/2024    CALCIUM 8.5 (L) 07/25/2024    ALBUMIN 4.0 07/25/2024    AST 25 07/25/2024    ALT 27 07/25/2024       Patient seen in no apparent distress.      PHYSICAL EXAM:     Foot/Ankle Exam    GENERAL  Appearance:  appears stated age  Orientation:  AAOx3  Affect:  appropriate  Gait:  unimpaired  Assistance:  independent  Right shoe gear:  casual shoe  Left shoe gear: casual shoe    VASCULAR     Right Foot Vascularity   Dorsalis pedis:  1+  Posterior tibial:  1+  Skin temperature:  warm  Edema grading:  None  CFT:  < 3 seconds  Pedal hair growth:  Present  Varicosities:  none     Left Foot Vascularity   Dorsalis pedis:  1+  Posterior tibial:  1+  Skin temperature:  warm  Edema grading:  None  CFT:  < 3 seconds  Pedal hair growth:  Present  Varicosities:  none     NEUROLOGIC     Right Foot Neurologic   Light touch sensation: absent  Vibratory sensation: absent  Hot/Cold sensation: absent     Left Foot Neurologic   Light touch sensation: absent  Vibratory sensation: absent  Hot/Cold sensation:  absent    MUSCLE STRENGTH     Right Foot Muscle Strength   Foot dorsiflexion:  4  Foot plantar flexion:  4  Foot inversion:  4  Foot eversion:  4     Left Foot Muscle Strength   Foot dorsiflexion:  4  Foot plantar flexion:  4  Foot inversion:  4  Foot eversion:  4    RANGE OF MOTION     Right Foot Range of Motion   Foot and ankle ROM within normal limits       Left Foot Range of Motion   Foot and ankle ROM within normal limits      DERMATOLOGIC      Right Foot Dermatologic   Skin  Right foot skin is intact.      Left Foot Dermatologic   Skin  Left foot skin is intact.      Right foot additional comments: Callus to right third toe with underlying granular distal clavi wound.  No purulent drainage no erythema to the toe.      RADIOLOGY:        XR Foot 3+ View Right (In Office)    Result Date: 8/14/2024  Narrative: XR FOOT 3+ VW RIGHT Date of Exam: 8/13/2024 2:12 PM EDT Indication: right foot 3rd toe ulcer - concern for osteomyelitis Comparison: None available. Findings: There is hallux valgus. There are extensive advanced degenerative changes in the right first MTP joint. There is soft tissue swelling in the right third toe.. There is a plantar calcaneal enthesophyte. Vascular calcification is present. There is osseous erosion in the tuft of the distal phalanx of  the right third toe consistent with osteomyelitis.     Impression: Impression: Osteomyelitis of the tuft of the distal phalanx of the right third toe. Electronically Signed: Toney Blanca MD  8/14/2024 2:10 PM EDT  Workstation ID: [id number]    CT Chest Low Dose Cancer Screening WO    Result Date: 8/6/2024  Narrative: CT CHEST LOW DOSE CANCER SCREENING WO Date of Exam: 8/5/2024 1:45 PM EDT Indication: Personal history of tobacco use. Current smoker, cough. Comparison: None available Technique: Low dose CT imaging of the chest was performed without intravenous contrast enhancement.  Automated exposure control and iterative reconstruction methods were used. Findings: The visualized soft tissue structures at the base of the neck including the thyroid appear within normal limits. There is no lower cervical or axillary adenopathy. The heart size is normal. There is no pericardial effusion. There is coronary artery atherosclerotic calcification. The aorta is normal in caliber without evidence of aneurysm formation. The main pulmonary artery is normal in caliber. There is no mediastinal or hilar lymphadenopathy by size criteria. The tracheobronchial tree is patent. There is no abnormal bronchial wall thickening or bronchiectasis. There is no acute consolidation, pleural effusion, or pneumothorax. There is upper lobe predominant centrilobular emphysema. There are no suspicious pulmonary nodules. The esophagus is normal in course and caliber. Visualized portions of the upper abdomen demonstrates a 2.6 cm cyst within the posterior aspect of the right hepatic lobe of the liver. There are multilevel degenerative changes of the cervical and thoracic spine. No suspicious lytic or sclerotic osseous lesion within the thorax.     Impression: Impression: 1. No suspicious pulmonary nodules. Recommend low-dose chest CT in 1 year. 2. Mild upper lobe predominant centrilobular emphysema. Recommendation: Continue annual screening with LDCT  Lung Rads Assessment: Lung-RADS L1 - Negative, <1% chance of malignancy. Electronically Signed: Noah Chowdary  8/6/2024 1:49 PM EDT  Workstation ID: [id number]     ASSESSMENT/PLAN     Diagnoses and all orders for this visit:    1. Chronic osteomyelitis of right foot with draining sinus (Primary)  -     Doppler Arterial Multi Level Lower Extremity - Bilateral CAR; Future    2. 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 Ulcer of right foot, unspecified ulcer stage        Comprehensive lower extremity examination and evaluation was performed.    Patient examined evaluated, currently on antibiotics.  Discussed conservative and surgical options including partial toe amputation.  Patient would like to proceed with partial toe amputation.  Discussed with patient we will need to get arterial studies prior to any surgical intervention to rule out circulatory issues.    Toe is stable no acute signs of infection if symptoms worsen patient to call the office or go to the emergency department.  Return to clinic in 1 week after arterial studies.    Discussed findings and treatment plan including risks, benefits, and treatment options with patient in detail. Patient agreed with treatment plan.    Medications and allergies reviewed.  Reviewed available lab values along with other pertinent labs.  These were discussed with the patient.    An After Visit Summary was printed and given to the patient at discharge, including (if requested) any available informative/educational handouts regarding diagnosis, treatment, or medications. All questions were answered to patient/family satisfaction. Should symptoms fail to improve or worsen they agree to call or return to clinic or to go to the Emergency Department. Discussed the importance of following up with any needed screening tests/labs/specialist appointments and any  requested follow-up recommended by me today. Importance of maintaining follow-up discussed and patient accepts that missed appointments can delay diagnosis and potentially lead to worsening of conditions.    No follow-ups on file., or sooner if acute issues arise.    This document has been electronically signed by Alton Fox DPM on August 17, 2024 07:18 EDT

## 2024-08-17 NOTE — H&P (VIEW-ONLY)
[facility] - PODIATRY    Today's Date: 08/17/24    Patient Name: Josemanuel De La Fuente  MRN: [id number]  CSN: [id number]  PCP: Tino Roach MD,  Referring Provider: No ref. provider found    SUBJECTIVE     Chief Complaint   Patient presents with    Left Foot - Establish Care, Diabetes, Nail Problem    Right Foot - Establish Care, Diabetes, Foot Ulcer, Nail Problem     3rd toe, noticed 3 days ago, saw PCP    Xray done 8/13/24  Yesterday blood sugar 111     HPI: Josemanuel De La Fuente, a 78 y.o.male, presents to clinic.    Patient is a 78-year-old diabetic male presenting with an ulcer to his right third toe.  Patient says his PCP noticed it about 3 days ago.  It was a callus on his big toe when they removed it there is a big sore there.  Patient says he was unaware of this and it has been there likely for a long period of time.  He does not have good feeling in his feet.  Patient says the x-ray showed bone changes of osteomyelitis.  He has moved here recently and has been on his feet more than usual.    Past Medical History:   Diagnosis Date    Diabetes mellitus     Foot ulcer     GERD (gastroesophageal reflux disease)     Low back pain     Pneumonia     Seizures      Past Surgical History:   Procedure Laterality Date    ELBOW PROCEDURE Left     EYE SURGERY Bilateral     KNEE SURGERY Bilateral     TONSILLECTOMY       History reviewed. No pertinent family history.  Social History     Socioeconomic History    Marital status:    Tobacco Use    Smoking status: Every Day     Current packs/day: 1.00     Average packs/day: 1 pack/day for 61.9 years (61.9 ttl pk-yrs)     Types: Cigarettes     Start date: 9/15/1962     Passive exposure: Current    Smokeless tobacco: Never   Vaping Use    Vaping status: Never Used   Substance and Sexual Activity    Alcohol use: Not Currently    Drug use: Never    Sexual activity: Not Currently     Partners: Female     No Known Allergies  Current Outpatient Medications    Medication Sig Dispense Refill    albuterol sulfate  (90 Base) MCG/ACT inhaler       atorvastatin (LIPITOR) 80 MG tablet       BD Pen Needle Nubia U/F 32G X 4 MM misc       clopidogrel (PLAVIX) 75 MG tablet       cyanocobalamin 1000 MCG/ML injection       Entresto  MG tablet       finasteride (PROSCAR) 5 MG tablet       FREESTYLE LITE test strip       Jardiance 25 MG tablet tablet       Lancets (freestyle) lancets       metoprolol succinate XL (TOPROL-XL) 100 MG 24 hr tablet       oxybutynin XL (DITROPAN-XL) 5 MG 24 hr tablet       Ozempic, 1 MG/DOSE, 4 MG/3ML solution pen-injector       sulfamethoxazole-trimethoprim (BACTRIM DS,SEPTRA DS) 800-160 MG per tablet Take 1 tablet by mouth 2 (Two) Times a Day for 7 days. 14 tablet 0    tamsulosin (FLOMAX) 0.4 MG capsule 24 hr capsule       Trelegy Ellipta 100-62.5-25 MCG/ACT inhaler Inhale 1 puff Daily for 180 days. 60 each 2    Tresiba FlexTouch 100 UNIT/ML solution pen-injector injection        No current facility-administered medications for this visit.     Review of Systems   Skin:         Ulcer right foot   Neurological:  Positive for numbness.   All other systems reviewed and are negative.      OBJECTIVE     Vitals:    08/16/24 0951   BP: 115/67   Pulse: 72   Temp: 97.3 °F (36.3 °C)   SpO2: 98%       WBC   Date Value Ref Range Status   07/25/2024 10.39 3.40 - 10.80 10*3/mm3 Final     RBC   Date Value Ref Range Status   07/25/2024 4.78 4.14 - 5.80 10*6/mm3 Final     Hemoglobin   Date Value Ref Range Status   07/25/2024 16.0 13.0 - 17.7 g/dL Final     Hematocrit   Date Value Ref Range Status   07/25/2024 47.8 37.5 - 51.0 % Final     MCV   Date Value Ref Range Status   07/25/2024 100.0 (H) 79.0 - 97.0 fL Final     MCH   Date Value Ref Range Status   07/25/2024 33.5 (H) 26.6 - 33.0 pg Final     MCHC   Date Value Ref Range Status   07/25/2024 33.5 31.5 - 35.7 g/dL Final     RDW   Date Value Ref Range Status   07/25/2024 13.2 12.3 - 15.4 % Final     RDW-SD    Date Value Ref Range Status   07/25/2024 48.1 37.0 - 54.0 fl Final     MPV   Date Value Ref Range Status   07/25/2024 10.9 6.0 - 12.0 fL Final     Platelets   Date Value Ref Range Status   07/25/2024 150 140 - 450 10*3/mm3 Final     Neutrophil %   Date Value Ref Range Status   07/25/2024 74.6 42.7 - 76.0 % Final     Lymphocyte %   Date Value Ref Range Status   07/25/2024 15.0 (L) 19.6 - 45.3 % Final     Monocyte %   Date Value Ref Range Status   07/25/2024 8.1 5.0 - 12.0 % Final     Eosinophil %   Date Value Ref Range Status   07/25/2024 1.4 0.3 - 6.2 % Final     Basophil %   Date Value Ref Range Status   07/25/2024 0.5 0.0 - 1.5 % Final     Immature Grans %   Date Value Ref Range Status   07/25/2024 0.4 0.0 - 0.5 % Final     Neutrophils, Absolute   Date Value Ref Range Status   07/25/2024 7.75 (H) 1.70 - 7.00 10*3/mm3 Final     Lymphocytes, Absolute   Date Value Ref Range Status   07/25/2024 1.56 0.70 - 3.10 10*3/mm3 Final     Monocytes, Absolute   Date Value Ref Range Status   07/25/2024 0.84 0.10 - 0.90 10*3/mm3 Final     Eosinophils, Absolute   Date Value Ref Range Status   07/25/2024 0.15 0.00 - 0.40 10*3/mm3 Final     Basophils, Absolute   Date Value Ref Range Status   07/25/2024 0.05 0.00 - 0.20 10*3/mm3 Final     Immature Grans, Absolute   Date Value Ref Range Status   07/25/2024 0.04 0.00 - 0.05 10*3/mm3 Final     nRBC   Date Value Ref Range Status   07/25/2024 0.0 0.0 - 0.2 /100 WBC Final         Lab Results   Component Value Date    GLUCOSE 121 (H) 07/25/2024    BUN 13 07/25/2024    CREATININE 0.86 07/25/2024    BCR 15.1 07/25/2024    K 4.2 07/25/2024    CO2 23.6 07/25/2024    CALCIUM 8.5 (L) 07/25/2024    ALBUMIN 4.0 07/25/2024    AST 25 07/25/2024    ALT 27 07/25/2024       Patient seen in no apparent distress.      PHYSICAL EXAM:     Foot/Ankle Exam    GENERAL  Appearance:  appears stated age  Orientation:  AAOx3  Affect:  appropriate  Gait:  unimpaired  Assistance:  independent  Right shoe gear:  casual shoe  Left shoe gear: casual shoe    VASCULAR     Right Foot Vascularity   Dorsalis pedis:  1+  Posterior tibial:  1+  Skin temperature:  warm  Edema grading:  None  CFT:  < 3 seconds  Pedal hair growth:  Present  Varicosities:  none     Left Foot Vascularity   Dorsalis pedis:  1+  Posterior tibial:  1+  Skin temperature:  warm  Edema grading:  None  CFT:  < 3 seconds  Pedal hair growth:  Present  Varicosities:  none     NEUROLOGIC     Right Foot Neurologic   Light touch sensation: absent  Vibratory sensation: absent  Hot/Cold sensation: absent     Left Foot Neurologic   Light touch sensation: absent  Vibratory sensation: absent  Hot/Cold sensation:  absent    MUSCLE STRENGTH     Right Foot Muscle Strength   Foot dorsiflexion:  4  Foot plantar flexion:  4  Foot inversion:  4  Foot eversion:  4     Left Foot Muscle Strength   Foot dorsiflexion:  4  Foot plantar flexion:  4  Foot inversion:  4  Foot eversion:  4    RANGE OF MOTION     Right Foot Range of Motion   Foot and ankle ROM within normal limits       Left Foot Range of Motion   Foot and ankle ROM within normal limits      DERMATOLOGIC      Right Foot Dermatologic   Skin  Right foot skin is intact.      Left Foot Dermatologic   Skin  Left foot skin is intact.      Right foot additional comments: Callus to right third toe with underlying granular distal clavi wound.  No purulent drainage no erythema to the toe.      RADIOLOGY:        XR Foot 3+ View Right (In Office)    Result Date: 8/14/2024  Narrative: XR FOOT 3+ VW RIGHT Date of Exam: 8/13/2024 2:12 PM EDT Indication: right foot 3rd toe ulcer - concern for osteomyelitis Comparison: None available. Findings: There is hallux valgus. There are extensive advanced degenerative changes in the right first MTP joint. There is soft tissue swelling in the right third toe.. There is a plantar calcaneal enthesophyte. Vascular calcification is present. There is osseous erosion in the tuft of the distal phalanx of  the right third toe consistent with osteomyelitis.     Impression: Impression: Osteomyelitis of the tuft of the distal phalanx of the right third toe. Electronically Signed: Toney Blanca MD  8/14/2024 2:10 PM EDT  Workstation ID: [id number]    CT Chest Low Dose Cancer Screening WO    Result Date: 8/6/2024  Narrative: CT CHEST LOW DOSE CANCER SCREENING WO Date of Exam: 8/5/2024 1:45 PM EDT Indication: Personal history of tobacco use. Current smoker, cough. Comparison: None available Technique: Low dose CT imaging of the chest was performed without intravenous contrast enhancement.  Automated exposure control and iterative reconstruction methods were used. Findings: The visualized soft tissue structures at the base of the neck including the thyroid appear within normal limits. There is no lower cervical or axillary adenopathy. The heart size is normal. There is no pericardial effusion. There is coronary artery atherosclerotic calcification. The aorta is normal in caliber without evidence of aneurysm formation. The main pulmonary artery is normal in caliber. There is no mediastinal or hilar lymphadenopathy by size criteria. The tracheobronchial tree is patent. There is no abnormal bronchial wall thickening or bronchiectasis. There is no acute consolidation, pleural effusion, or pneumothorax. There is upper lobe predominant centrilobular emphysema. There are no suspicious pulmonary nodules. The esophagus is normal in course and caliber. Visualized portions of the upper abdomen demonstrates a 2.6 cm cyst within the posterior aspect of the right hepatic lobe of the liver. There are multilevel degenerative changes of the cervical and thoracic spine. No suspicious lytic or sclerotic osseous lesion within the thorax.     Impression: Impression: 1. No suspicious pulmonary nodules. Recommend low-dose chest CT in 1 year. 2. Mild upper lobe predominant centrilobular emphysema. Recommendation: Continue annual screening with LDCT  Lung Rads Assessment: Lung-RADS L1 - Negative, <1% chance of malignancy. Electronically Signed: Noah Chowdary  8/6/2024 1:49 PM EDT  Workstation ID: [id number]     ASSESSMENT/PLAN     Diagnoses and all orders for this visit:    1. Chronic osteomyelitis of right foot with draining sinus (Primary)  -     Doppler Arterial Multi Level Lower Extremity - Bilateral CAR; Future    2. 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 Ulcer of right foot, unspecified ulcer stage        Comprehensive lower extremity examination and evaluation was performed.    Patient examined evaluated, currently on antibiotics.  Discussed conservative and surgical options including partial toe amputation.  Patient would like to proceed with partial toe amputation.  Discussed with patient we will need to get arterial studies prior to any surgical intervention to rule out circulatory issues.    Toe is stable no acute signs of infection if symptoms worsen patient to call the office or go to the emergency department.  Return to clinic in 1 week after arterial studies.    Discussed findings and treatment plan including risks, benefits, and treatment options with patient in detail. Patient agreed with treatment plan.    Medications and allergies reviewed.  Reviewed available lab values along with other pertinent labs.  These were discussed with the patient.    An After Visit Summary was printed and given to the patient at discharge, including (if requested) any available informative/educational handouts regarding diagnosis, treatment, or medications. All questions were answered to patient/family satisfaction. Should symptoms fail to improve or worsen they agree to call or return to clinic or to go to the Emergency Department. Discussed the importance of following up with any needed screening tests/labs/specialist appointments and any  requested follow-up recommended by me today. Importance of maintaining follow-up discussed and patient accepts that missed appointments can delay diagnosis and potentially lead to worsening of conditions.    No follow-ups on file., or sooner if acute issues arise.    This document has been electronically signed by Alton Fox DPM on August 17, 2024 07:18 EDT

## 2024-08-19 ENCOUNTER — TELEPHONE (OUTPATIENT)
Dept: FAMILY MEDICINE CLINIC | Facility: CLINIC | Age: 79
End: 2024-08-19

## 2024-08-19 ENCOUNTER — TELEPHONE (OUTPATIENT)
Dept: PODIATRY | Facility: CLINIC | Age: 79
End: 2024-08-19

## 2024-08-20 DIAGNOSIS — E11.65 TYPE 2 DIABETES MELLITUS WITH HYPERGLYCEMIA, WITH LONG-TERM CURRENT USE OF INSULIN: Primary | ICD-10-CM

## 2024-08-20 DIAGNOSIS — M86.471 CHRONIC OSTEOMYELITIS OF RIGHT FOOT WITH DRAINING SINUS: Primary | ICD-10-CM

## 2024-08-20 DIAGNOSIS — Z79.4 TYPE 2 DIABETES MELLITUS WITH HYPERGLYCEMIA, WITH LONG-TERM CURRENT USE OF INSULIN: Primary | ICD-10-CM

## 2024-08-20 RX ORDER — SODIUM CHLORIDE, SODIUM LACTATE, POTASSIUM CHLORIDE, CALCIUM CHLORIDE 600; 310; 30; 20 MG/100ML; MG/100ML; MG/100ML; MG/100ML
100 INJECTION, SOLUTION INTRAVENOUS CONTINUOUS
OUTPATIENT
Start: 2024-08-20

## 2024-08-20 RX ORDER — SODIUM CHLORIDE 9 MG/ML
40 INJECTION, SOLUTION INTRAVENOUS AS NEEDED
OUTPATIENT
Start: 2024-08-20

## 2024-08-20 RX ORDER — SODIUM CHLORIDE 0.9 % (FLUSH) 0.9 %
10 SYRINGE (ML) INJECTION EVERY 12 HOURS SCHEDULED
OUTPATIENT
Start: 2024-08-20

## 2024-08-20 RX ORDER — SODIUM CHLORIDE 0.9 % (FLUSH) 0.9 %
10 SYRINGE (ML) INJECTION AS NEEDED
OUTPATIENT
Start: 2024-08-20

## 2024-08-26 ENCOUNTER — HOSPITAL ENCOUNTER (OUTPATIENT)
Dept: CARDIOLOGY | Facility: HOSPITAL | Age: 79
Discharge: HOME | End: 2024-08-26
Admitting: PODIATRIST
Payer: MEDICARE

## 2024-08-26 ENCOUNTER — TELEPHONE (OUTPATIENT)
Dept: PODIATRY | Facility: CLINIC | Age: 79
End: 2024-08-26
Payer: MEDICARE

## 2024-08-26 DIAGNOSIS — E08.59 DIABETES MELLITUS DUE TO UNDERLYING CONDITION WITH OTHER CIRCULATORY COMPLICATIONS: ICD-10-CM

## 2024-08-26 DIAGNOSIS — M86.471 CHRONIC OSTEOMYELITIS OF RIGHT FOOT WITH DRAINING SINUS: ICD-10-CM

## 2024-08-26 LAB
BH CV LOWER ARTERIAL LEFT ABI RATIO: 1.1
BH CV LOWER ARTERIAL LEFT DORSALIS PEDIS SYS MAX: 140
BH CV LOWER ARTERIAL LEFT GREAT TOE SYS MAX: 96
BH CV LOWER ARTERIAL LEFT POST TIBIAL SYS MAX: 136
BH CV LOWER ARTERIAL LEFT TBI RATIO: 0.76
BH CV LOWER ARTERIAL RIGHT ABI RATIO: 0.96
BH CV LOWER ARTERIAL RIGHT DORSALIS PEDIS SYS MAX: 122
BH CV LOWER ARTERIAL RIGHT GREAT TOE SYS MAX: 81
BH CV LOWER ARTERIAL RIGHT POST TIBIAL SYS MAX: 121
BH CV LOWER ARTERIAL RIGHT TBI RATIO: 0.64
UPPER ARTERIAL LEFT ARM BRACHIAL SYS MAX: 127
UPPER ARTERIAL RIGHT ARM BRACHIAL SYS MAX: 127

## 2024-08-26 PROCEDURE — 93922 UPR/L XTREMITY ART 2 LEVELS: CPT

## 2024-08-26 NOTE — PAT
SPOKE WITH MOHAMUD AT DR SOUSA OFFICE AND MADE AWARE OF LAST DOSE OF OZEMPIC WAS 8/24/24 AND LAST DOSE OF JARDIANCE WAS 8/25/24.  PER ANESTHESIA LIKE FOR LAST DOSE OF OZEMPIC TO BE AT LEAST 7 DAYS PRIOR TO PROCEDURE AND LAST DOSE OF JARDIANCE TO BE AT LEAST 3 DAYS PRIOR TO PROCEDURE

## 2024-08-26 NOTE — TELEPHONE ENCOUNTER
Hub staff attempted to follow warm transfer process and was unsuccessful     Caller: Josemanuel De La Fuente    Relationship to patient: Self    Best call back number: [phone]    Patient is needing: WANTS TO KNOW IF HE NEEDS TO GET MORE ANTIBIOTIC AND WHAT TO DO TO CLEAN THE WOUND - PLEASE REACH OUT AND ADVISE

## 2024-08-26 NOTE — PRE-PROCEDURE INSTRUCTIONS
IMPORTANT INSTRUCTIONS - PRE-ADMISSION TESTING  DO NOT EAT OR CHEW anything after midnight the night before your procedure.    HX OF OZEMPIC USE NPO AFTER MN EXCEPT SIPS OF WATER TO TAKE MEDICATIONS WITH   Take the following medications the morning of your procedure with JUST A SIP OF WATER:  __ALBUTEROL INHALER IF NEEDED AND BRING WITH YOU, FINASTERIDE, METORPROLOL, TAMSULOSIN, TRELEGY INHALER AND BRING WITH YOU, ONLY HALF OF YOUR USUAL DOSE OF TRESIBA , PER DR MERRY RUIZ TO TAKE PLAVIX UP TO AND INCLUDING DAY OF PROCEDURE_____________________________________________________________________________________________________________________________________________________________________________________    DO NOT BRING your medications to the hospital with you, UNLESS something has changed since your PRE-Admission Testing appointment.  Hold all vitamins, supplements, and NSAIDS (Non- steroidal anti-inflammatory meds) for one week prior to surgery (you MAY take Tylenol or Acetaminophen).  If you are diabetic, check your blood sugar the morning of your procedure. If it is less than 70 or if you are feeling symptomatic, call the following number for further instructions: [phone] _______.  Use your inhalers/nebulizers as usual, the morning of your procedure. BRING YOUR INHALERS with you.   Bring your CPAP or BIPAP to hospital, ONLY IF YOU WILL BE SPENDING THE NIGHT.   Make sure you have a ride home and have someone who will stay with you the day of your procedure after you go home.  If you have any questions, please call your Pre-Admission Testing Nurse, ___FANNY_____________ at [phone]____________.   Per anesthesia request, do not smoke for 24 hours before your procedure or as instructed by your surgeon.    WILL CALL ON   8/30/24      NORMALLY BETWEEN 1 AND 4 PM TO GIVE OFFICIAL ARRIVAL TIME FOR DAY OF PROCEDURE  BATHING INSTRUCTIONS GIVEN. NO JEWELRY OF ANY TYPE  COME TO ENTRANCE A, ELEVATOR A, 3RD FLOOR DAY  OF PROCEDURE. COME TO ENTRANCE C [facility] MAIN DOOR DAY OF PROCEDURE  CASH,  OR CARD FOR MEDS TO BED IF INDICATED AT DISCHARGE  DO NOT TAKE OZEMPIC 7 DAYS PRIOR TO PROCEDURE. PT AWARE TO NOT TAKE ANOTHER DOSE PRIOR TO PROCEDURE  DO NOT TAKE JARDIANCE 3 DAYS PRIOR TO PROCEDURE. LAST DOSE TO BE 8/30/24   TAKE ONLY HALF OF YOUR USUAL DOSE OF TRESIBA AM OF PROCEDURE

## 2024-08-27 RX ORDER — SULFAMETHOXAZOLE/TRIMETHOPRIM 800-160 MG
1 TABLET ORAL 2 TIMES DAILY
Qty: 14 TABLET | Refills: 0 | Status: SHIPPED | OUTPATIENT
Start: 2024-08-27 | End: 2024-09-03

## 2024-08-28 NOTE — PAT
SPOKE WITH BRIA AT DR SOUSA OFFICE AND SHE REPORTED THAT PT IS OK TO CONTINUE PLAVIX UP TO AND INCLUDING DAY OF PROCEDURE

## 2024-08-28 NOTE — PAT
SPOKE WITH PT AND REMINDED TO NOT TAKE ANOTHER DOSE OF OZEMPIC, THAT HIS LAST DOSE OF JARDIANCE WAS TO BE 8/30/34, AND THAT PER DR SOUSA IS OK WITH HIM CONTINUING HIS PLAVIX UP TO AND INCLUDING DAY OF PROCEDURE

## 2024-09-03 ENCOUNTER — ANESTHESIA EVENT (OUTPATIENT)
Dept: PERIOP | Facility: HOSPITAL | Age: 79
End: 2024-09-03
Payer: MEDICARE

## 2024-09-03 ENCOUNTER — ANESTHESIA (OUTPATIENT)
Dept: PERIOP | Facility: HOSPITAL | Age: 79
End: 2024-09-03
Payer: MEDICARE

## 2024-09-03 ENCOUNTER — HOSPITAL ENCOUNTER (OUTPATIENT)
Facility: HOSPITAL | Age: 79
Setting detail: HOSPITAL OUTPATIENT SURGERY
Discharge: HOME | End: 2024-09-03
Attending: PODIATRIST | Admitting: PODIATRIST
Payer: MEDICARE

## 2024-09-03 VITALS
RESPIRATION RATE: 16 BRPM | HEIGHT: 71 IN | OXYGEN SATURATION: 97 % | HEART RATE: 68 BPM | SYSTOLIC BLOOD PRESSURE: 116 MMHG | TEMPERATURE: 97.5 F | BODY MASS INDEX: 26.45 KG/M2 | WEIGHT: 188.93 LBS | DIASTOLIC BLOOD PRESSURE: 51 MMHG

## 2024-09-03 DIAGNOSIS — M86.471 CHRONIC OSTEOMYELITIS OF RIGHT FOOT WITH DRAINING SINUS: Primary | ICD-10-CM

## 2024-09-03 LAB
GLUCOSE BLDC GLUCOMTR-MCNC: 104 MG/DL (ref 70–99)
GLUCOSE BLDC GLUCOMTR-MCNC: 123 MG/DL (ref 70–99)

## 2024-09-03 PROCEDURE — 87015 SPECIMEN INFECT AGNT CONCNTJ: CPT | Performed by: PODIATRIST

## 2024-09-03 PROCEDURE — 25010000002 CEFAZOLIN PER 500 MG: Performed by: PODIATRIST

## 2024-09-03 PROCEDURE — 88311 DECALCIFY TISSUE: CPT | Performed by: PODIATRIST

## 2024-09-03 PROCEDURE — 87205 SMEAR GRAM STAIN: CPT | Performed by: PODIATRIST

## 2024-09-03 PROCEDURE — 25010000002 BUPIVACAINE (PF) 0.5 % SOLUTION 10 ML VIAL: Performed by: PODIATRIST

## 2024-09-03 PROCEDURE — 87070 CULTURE OTHR SPECIMN AEROBIC: CPT | Performed by: PODIATRIST

## 2024-09-03 PROCEDURE — 25010000002 PROPOFOL 10 MG/ML EMULSION

## 2024-09-03 PROCEDURE — 25010000002 LIDOCAINE 1 % SOLUTION 10 ML VIAL: Performed by: PODIATRIST

## 2024-09-03 PROCEDURE — 25010000002 MIDAZOLAM PER 1MG: Performed by: ANESTHESIOLOGY

## 2024-09-03 PROCEDURE — 28810 AMPUTATION TOE & METATARSAL: CPT | Performed by: PODIATRIST

## 2024-09-03 PROCEDURE — 82948 REAGENT STRIP/BLOOD GLUCOSE: CPT | Performed by: ANESTHESIOLOGY

## 2024-09-03 PROCEDURE — 82948 REAGENT STRIP/BLOOD GLUCOSE: CPT

## 2024-09-03 PROCEDURE — 88305 TISSUE EXAM BY PATHOLOGIST: CPT | Performed by: PODIATRIST

## 2024-09-03 PROCEDURE — 25010000002 DEXAMETHASONE PER 1 MG

## 2024-09-03 PROCEDURE — 25010000002 ONDANSETRON PER 1 MG

## 2024-09-03 PROCEDURE — 87075 CULTR BACTERIA EXCEPT BLOOD: CPT | Performed by: PODIATRIST

## 2024-09-03 PROCEDURE — 25010000002 FENTANYL CITRATE (PF) 50 MCG/ML SOLUTION

## 2024-09-03 PROCEDURE — 25810000003 LACTATED RINGERS PER 1000 ML: Performed by: ANESTHESIOLOGY

## 2024-09-03 RX ORDER — OXYCODONE HYDROCHLORIDE 5 MG/1
5 TABLET ORAL
Status: DISCONTINUED | OUTPATIENT
Start: 2024-09-03 | End: 2024-09-03 | Stop reason: HOSPADM

## 2024-09-03 RX ORDER — ONDANSETRON 2 MG/ML
INJECTION INTRAMUSCULAR; INTRAVENOUS AS NEEDED
Status: DISCONTINUED | OUTPATIENT
Start: 2024-09-03 | End: 2024-09-03 | Stop reason: SURG

## 2024-09-03 RX ORDER — PROMETHAZINE HYDROCHLORIDE 12.5 MG/1
25 TABLET ORAL ONCE AS NEEDED
Status: DISCONTINUED | OUTPATIENT
Start: 2024-09-03 | End: 2024-09-03 | Stop reason: HOSPADM

## 2024-09-03 RX ORDER — SODIUM CHLORIDE, SODIUM LACTATE, POTASSIUM CHLORIDE, CALCIUM CHLORIDE 600; 310; 30; 20 MG/100ML; MG/100ML; MG/100ML; MG/100ML
100 INJECTION, SOLUTION INTRAVENOUS CONTINUOUS
Status: DISCONTINUED | OUTPATIENT
Start: 2024-09-03 | End: 2024-09-03 | Stop reason: HOSPADM

## 2024-09-03 RX ORDER — DEXAMETHASONE SODIUM PHOSPHATE 4 MG/ML
INJECTION, SOLUTION INTRA-ARTICULAR; INTRALESIONAL; INTRAMUSCULAR; INTRAVENOUS; SOFT TISSUE AS NEEDED
Status: DISCONTINUED | OUTPATIENT
Start: 2024-09-03 | End: 2024-09-03 | Stop reason: SURG

## 2024-09-03 RX ORDER — HYDROCODONE BITARTRATE AND ACETAMINOPHEN 7.5; 325 MG/1; MG/1
1 TABLET ORAL EVERY 6 HOURS PRN
Qty: 30 TABLET | Refills: 0 | Status: SHIPPED | OUTPATIENT
Start: 2024-09-03

## 2024-09-03 RX ORDER — FENTANYL CITRATE 50 UG/ML
INJECTION, SOLUTION INTRAMUSCULAR; INTRAVENOUS AS NEEDED
Status: DISCONTINUED | OUTPATIENT
Start: 2024-09-03 | End: 2024-09-03 | Stop reason: SURG

## 2024-09-03 RX ORDER — MEPERIDINE HYDROCHLORIDE 25 MG/ML
12.5 INJECTION INTRAMUSCULAR; INTRAVENOUS; SUBCUTANEOUS
Status: DISCONTINUED | OUTPATIENT
Start: 2024-09-03 | End: 2024-09-03 | Stop reason: HOSPADM

## 2024-09-03 RX ORDER — MAGNESIUM HYDROXIDE 1200 MG/15ML
LIQUID ORAL AS NEEDED
Status: DISCONTINUED | OUTPATIENT
Start: 2024-09-03 | End: 2024-09-03 | Stop reason: HOSPADM

## 2024-09-03 RX ORDER — PROPOFOL 10 MG/ML
VIAL (ML) INTRAVENOUS AS NEEDED
Status: DISCONTINUED | OUTPATIENT
Start: 2024-09-03 | End: 2024-09-03 | Stop reason: SURG

## 2024-09-03 RX ORDER — ACETAMINOPHEN 650 MG
TABLET, EXTENDED RELEASE ORAL AS NEEDED
Status: DISCONTINUED | OUTPATIENT
Start: 2024-09-03 | End: 2024-09-03 | Stop reason: HOSPADM

## 2024-09-03 RX ORDER — SODIUM CHLORIDE 0.9 % (FLUSH) 0.9 %
10 SYRINGE (ML) INJECTION AS NEEDED
Status: DISCONTINUED | OUTPATIENT
Start: 2024-09-03 | End: 2024-09-03 | Stop reason: HOSPADM

## 2024-09-03 RX ORDER — EPHEDRINE SULFATE 50 MG/ML
INJECTION INTRAVENOUS AS NEEDED
Status: DISCONTINUED | OUTPATIENT
Start: 2024-09-03 | End: 2024-09-03 | Stop reason: SURG

## 2024-09-03 RX ORDER — SODIUM CHLORIDE 9 MG/ML
40 INJECTION, SOLUTION INTRAVENOUS AS NEEDED
Status: DISCONTINUED | OUTPATIENT
Start: 2024-09-03 | End: 2024-09-03 | Stop reason: HOSPADM

## 2024-09-03 RX ORDER — ACETAMINOPHEN 500 MG
1000 TABLET ORAL ONCE
Status: COMPLETED | OUTPATIENT
Start: 2024-09-03 | End: 2024-09-03

## 2024-09-03 RX ORDER — MIDAZOLAM HYDROCHLORIDE 2 MG/2ML
1 INJECTION, SOLUTION INTRAMUSCULAR; INTRAVENOUS ONCE
Status: COMPLETED | OUTPATIENT
Start: 2024-09-03 | End: 2024-09-03

## 2024-09-03 RX ORDER — LIDOCAINE HYDROCHLORIDE 20 MG/ML
INJECTION, SOLUTION EPIDURAL; INFILTRATION; INTRACAUDAL; PERINEURAL AS NEEDED
Status: DISCONTINUED | OUTPATIENT
Start: 2024-09-03 | End: 2024-09-03 | Stop reason: SURG

## 2024-09-03 RX ORDER — ONDANSETRON 2 MG/ML
4 INJECTION INTRAMUSCULAR; INTRAVENOUS ONCE AS NEEDED
Status: DISCONTINUED | OUTPATIENT
Start: 2024-09-03 | End: 2024-09-03 | Stop reason: HOSPADM

## 2024-09-03 RX ORDER — SODIUM CHLORIDE 0.9 % (FLUSH) 0.9 %
10 SYRINGE (ML) INJECTION EVERY 12 HOURS SCHEDULED
Status: DISCONTINUED | OUTPATIENT
Start: 2024-09-03 | End: 2024-09-03 | Stop reason: HOSPADM

## 2024-09-03 RX ORDER — PROMETHAZINE HYDROCHLORIDE 25 MG/1
25 SUPPOSITORY RECTAL ONCE AS NEEDED
Status: DISCONTINUED | OUTPATIENT
Start: 2024-09-03 | End: 2024-09-03 | Stop reason: HOSPADM

## 2024-09-03 RX ORDER — SODIUM CHLORIDE, SODIUM LACTATE, POTASSIUM CHLORIDE, CALCIUM CHLORIDE 600; 310; 30; 20 MG/100ML; MG/100ML; MG/100ML; MG/100ML
9 INJECTION, SOLUTION INTRAVENOUS CONTINUOUS PRN
Status: DISCONTINUED | OUTPATIENT
Start: 2024-09-03 | End: 2024-09-03 | Stop reason: HOSPADM

## 2024-09-03 RX ORDER — IPRATROPIUM BROMIDE AND ALBUTEROL SULFATE 2.5; .5 MG/3ML; MG/3ML
3 SOLUTION RESPIRATORY (INHALATION) ONCE
Status: COMPLETED | OUTPATIENT
Start: 2024-09-03 | End: 2024-09-03

## 2024-09-03 RX ADMIN — EPHEDRINE SULFATE 5 MG: 50 INJECTION INTRAVENOUS at 10:08

## 2024-09-03 RX ADMIN — EPHEDRINE SULFATE 10 MG: 50 INJECTION INTRAVENOUS at 10:14

## 2024-09-03 RX ADMIN — EPHEDRINE SULFATE 5 MG: 50 INJECTION INTRAVENOUS at 10:12

## 2024-09-03 RX ADMIN — SODIUM CHLORIDE, POTASSIUM CHLORIDE, SODIUM LACTATE AND CALCIUM CHLORIDE 9 ML/HR: 600; 310; 30; 20 INJECTION, SOLUTION INTRAVENOUS at 08:12

## 2024-09-03 RX ADMIN — IPRATROPIUM BROMIDE AND ALBUTEROL SULFATE 3 ML: .5; 3 SOLUTION RESPIRATORY (INHALATION) at 08:12

## 2024-09-03 RX ADMIN — ACETAMINOPHEN 1000 MG: 500 TABLET ORAL at 08:12

## 2024-09-03 RX ADMIN — PROPOFOL 130 MG: 10 INJECTION, EMULSION INTRAVENOUS at 09:58

## 2024-09-03 RX ADMIN — SODIUM CHLORIDE 2000 MG: 9 INJECTION, SOLUTION INTRAVENOUS at 10:02

## 2024-09-03 RX ADMIN — EPHEDRINE SULFATE 5 MG: 50 INJECTION INTRAVENOUS at 10:15

## 2024-09-03 RX ADMIN — MIDAZOLAM HYDROCHLORIDE 1 MG: 1 INJECTION, SOLUTION INTRAMUSCULAR; INTRAVENOUS at 09:40

## 2024-09-03 RX ADMIN — FENTANYL CITRATE 50 MCG: 50 INJECTION, SOLUTION INTRAMUSCULAR; INTRAVENOUS at 09:58

## 2024-09-03 RX ADMIN — DEXAMETHASONE SODIUM PHOSPHATE 4 MG: 4 INJECTION, SOLUTION INTRAMUSCULAR; INTRAVENOUS at 10:10

## 2024-09-03 RX ADMIN — LIDOCAINE HYDROCHLORIDE 60 MG: 20 INJECTION, SOLUTION INTRAVENOUS at 09:58

## 2024-09-03 RX ADMIN — FENTANYL CITRATE 25 MCG: 50 INJECTION, SOLUTION INTRAMUSCULAR; INTRAVENOUS at 10:06

## 2024-09-03 RX ADMIN — ONDANSETRON HYDROCHLORIDE 4 MG: 2 SOLUTION INTRAMUSCULAR; INTRAVENOUS at 10:10

## 2024-09-03 NOTE — OP NOTE
Operative Note   Foot and Ankle Surgery   Provider: Dr. Alton Fox DPM  Location: [facility]    Patient Name:  Josemanuel De La Fuente  YOB: 1945    Date of Surgery:  9/3/2024    Pre-op Diagnosis:   Chronic osteomyelitis of right foot with draining sinus [M86.471]       Post-Op Diagnosis Codes:     * Chronic osteomyelitis of right foot with draining sinus [M86.471]    Procedure/CPT® Codes:  DE AMPUTATION METATARSAL+TOE,SINGLE [23162]    Procedure(s):  AMPUTATION DIGIT RIGHT 3RD TOE        Staff:  Surgeon(s):  Alton Fox DPM    Assistant: Jessica Watson RN CSA  was responsible for performing the following activities: Retraction, Suction, and Irrigation and their skilled assistance was necessary for the success of this case.    Anesthesia: General    Estimated Blood Loss: less than 5 ml    Implants:    Nothing was implanted during the procedure    Specimen:          Specimens       ID Source Type Tests Collected By Collected At Frozen?    1 Toe, Right Wound ANAEROBIC CULTURE  WOUND CULTURE   Alton Fox DPM 9/3/24 1007     Description: Right Foot    A Toe, Right Tissue TISSUE PATHOLOGY EXAM   Alton Fox DPM 9/3/24 1008 No    Description: Right third toe            Findings: Osteomyelitis     Complications: none    Description of Procedure:     Procedure, risks, complications, and goals were discussed with the patient at bedside.  Risks include but are not limited to infection, complications from anesthesia (including death), chronic pain or numbness, hematoma/seroma, deep vein thrombosis, wound complications, and potential for additional surgical procedures.  Patient understands and elects to proceed with surgery at this time. Informed consent was obtained before proceeding to the operating suite.  All questions were answered to the patient's satisfaction. No guarantees or assurances were given or implied.    Right foot was prepped and draped in usual sterile  manner.  Right pneumatic ankle tourniquet was inflated 250 mmHg.  Attention was directed to the third digit of the right foot where an incision was made over the third metatarsal phalangeal joint deepened through subcutaneous tissue down to bone.  The toe was disarticulated at the metatarsophalangeal joint.  The metatarsal was noted to be healthy in nature and no signs of infection were noted.  Cultures were taken.  Site was flushed with copious amounts saline.  Deep tissue was closed using 3-0 Vicryl subcutaneous tissue was closed using 4-0 Vicryl and skin was closing 3-0 nylon.  The tourniquet was deflated and proper hyperemic response was noted to the right foot.  Patient was dressed with Betadine Adaptic 4 x 4's Kerlix Ace wrap.  Patient tolerated anesthesia and procedure well.    Alton Fox DPM  [facility]   [phone]    Alton Fox DPM     Date: 9/3/2024  Time: 10:31 EDT

## 2024-09-03 NOTE — DISCHARGE INSTRUCTIONS
DISCHARGE INSTRUCTIONS  PODIATRY SURGERY       For your surgery you had:  General anesthesia (you may have a sore throat for the first 24 hours)  Local anesthesia    You may experience dizziness, drowsiness, or light-headedness for several hours following surgery  Do not stay alone today or tonight.  Limit your activity for 24 hours.  Resume your diet slowly.  Follow whatever special dietary instructions you may have been given by the doctor.  You should not drive or operate machinery or drink alcohol for 24 hours or while you are taking pain medication.You should not sign any legally binding documents.  If you had an axillary or regional block, you will not have control of the involved limb for up to 12 hours.  Protect the arm with a sling or follow your physician's specific instructions.  You may remove dressing:  [] in 24 hours  [] in 48 hours  [x] Other: DO NOT TOUCH DRESSING.  You may shower: KEEP DRESSING DRY.  Sleep with the injured part elevated on a pillow.  Follow verbal instructions of your doctor.  Sit with the lower leg propped up on a footstool or chair with pillows.    SPECIAL INSTRUCTIONS:    PLEASE FOLLOW ALL WRITTEN AND VERBAL INSTRUCTIONS OF DR SOUSA    Last dose of pain medication was given at:    TYLENOL 1000 MG AT 8:12 AM Ice bag to injured area for 72 hours.  Apply 20 minutes on - 20 minutes off.  Never place ice directly on skin or cast.     Bend knee and rotate ankle for 5 minutes every hour to support circulation.  DO NOT REMOVE DRESSING. KEEP DRESSING DRY. You may try to bathe in a tub, while keeping foot out of tub.  Small amount of bleeding through bandage may occur and is normal, unless it becomes heavy or persists, call office in this case.  Eat well balanced diet high in protein and vitamin c, may take supplemental vitamins and calcium. Drink plenty of fluids and get plenty of rest with foot elevated.  Use crutches, walker or cane for ambulation depending on weight bearing  status. No driving until released by MD.    In addition to these instructions, follow the discharge instructions on postoperative arthroscopic surgery.      NOTIFY THE PHYSICIAN IF YOU EXPERIENCE:  Numbness of toes.  Inability to move toes.  Extreme coldness, paleness or blue dis-coloration of toes.  Excessive swelling of affected surgical site or swelling that causes the cast to rub or cut into skin.  Pain unrelieved by pain medication  Nausea/vomiting not relieved by prescribed medication  Unable to urinate in 6 hours after surgery  Temperature greater than 101 degree Fahrenheit or chills  If unable to reach your doctor, please go to the closest emergency room

## 2024-09-03 NOTE — ANESTHESIA PREPROCEDURE EVALUATION
Anesthesia Evaluation     Patient summary reviewed and Nursing notes reviewed   no history of anesthetic complications:   NPO Solid Status: > 8 hours  NPO Liquid Status: > 2 hours           Airway   Mallampati: II  TM distance: >3 FB  Neck ROM: full  No difficulty expected  Dental    (+) edentulous    Pulmonary    (+) COPD moderate,decreased breath sounds  Cardiovascular - normal exam  Exercise tolerance: poor (<4 METS)    Rhythm: regular  Rate: normal    (+) hypertension, CAD, PVD, hyperlipidemia      Neuro/Psych  (+) seizures well controlled  GI/Hepatic/Renal/Endo    (+) GERD well controlled, diabetes mellitus type 2    Musculoskeletal (-) negative ROS    Abdominal    Substance History - negative use     OB/GYN negative ob/gyn ROS         Other - negative ROS       ROS/Med Hx Other: <4METS, PAIN.HX CAD,ICM,HTN,HLD,PAD,COPD,STENTSX3 2019.CARDS OV 1/24/24 EF POST STENTING 2019 43%,DENIES C/P, SOA. TRANSFERRING TO DR SUGGS/APPT. 10/24. KT               Anesthesia Plan    ASA 3     general     (Patient understands anesthesia not responsible for dental damage.  Preop duo neb)  intravenous induction     Anesthetic plan, risks, benefits, and alternatives have been provided, discussed and informed consent has been obtained with: patient.  Pre-procedure education provided  Plan discussed with CRNA.    CODE STATUS:

## 2024-09-03 NOTE — ANESTHESIA POSTPROCEDURE EVALUATION
Patient: Josemanuel De La Fuente    Procedure Summary       Date: 09/03/24 Room / Location: [facility] OR 04 / [facility] MAIN OR    Anesthesia Start: 0953 Anesthesia Stop: 1026    Procedure: AMPUTATION DIGIT RIGHT 3RD TOE (Right) Diagnosis:       Chronic osteomyelitis of right foot with draining sinus      (Chronic osteomyelitis of right foot with draining sinus [M86.471])    Surgeons: Alton Fox DPM Provider: Darnell Balbuena MD    Anesthesia Type: general ASA Status: 3            Anesthesia Type: general    Vitals  Vitals Value Taken Time   /53 09/03/24 1050   Temp 36.2 °C (97.1 °F) 09/03/24 1025   Pulse 79 09/03/24 1054   Resp 16 09/03/24 1045   SpO2 95 % 09/03/24 1054   Vitals shown include unfiled device data.        Post Anesthesia Care and Evaluation    Patient location during evaluation: bedside  Patient participation: complete - patient participated  Level of consciousness: awake  Pain score: 2  Pain management: adequate    Airway patency: patent  PONV Status: none  Cardiovascular status: acceptable and stable  Respiratory status: acceptable  Hydration status: acceptable

## 2024-09-04 ENCOUNTER — TELEPHONE (OUTPATIENT)
Dept: PODIATRY | Facility: CLINIC | Age: 79
End: 2024-09-04

## 2024-09-05 LAB
CYTO UR: NORMAL
LAB AP CASE REPORT: NORMAL
LAB AP CLINICAL INFORMATION: NORMAL
PATH REPORT.FINAL DX SPEC: NORMAL
PATH REPORT.GROSS SPEC: NORMAL

## 2024-09-06 LAB
BACTERIA SPEC AEROBE CULT: NORMAL
GRAM STN SPEC: NORMAL
GRAM STN SPEC: NORMAL

## 2024-09-08 LAB — BACTERIA SPEC ANAEROBE CULT: NORMAL

## 2024-09-13 ENCOUNTER — OFFICE VISIT (OUTPATIENT)
Dept: PODIATRY | Facility: CLINIC | Age: 79
End: 2024-09-13
Payer: MEDICARE

## 2024-09-13 VITALS
OXYGEN SATURATION: 97 % | SYSTOLIC BLOOD PRESSURE: 106 MMHG | DIASTOLIC BLOOD PRESSURE: 65 MMHG | HEIGHT: 71 IN | BODY MASS INDEX: 26.74 KG/M2 | WEIGHT: 191 LBS | TEMPERATURE: 97.7 F | HEART RATE: 73 BPM

## 2024-09-13 DIAGNOSIS — M86.471 CHRONIC OSTEOMYELITIS OF RIGHT FOOT WITH DRAINING SINUS: Primary | ICD-10-CM

## 2024-09-13 DIAGNOSIS — E08.59 DIABETES MELLITUS DUE TO UNDERLYING CONDITION WITH OTHER CIRCULATORY COMPLICATIONS: ICD-10-CM

## 2024-09-13 DIAGNOSIS — L97.519 ULCER OF RIGHT FOOT, UNSPECIFIED ULCER STAGE: ICD-10-CM

## 2024-09-13 PROCEDURE — 1160F RVW MEDS BY RX/DR IN RCRD: CPT | Performed by: PODIATRIST

## 2024-09-13 PROCEDURE — 1159F MED LIST DOCD IN RCRD: CPT | Performed by: PODIATRIST

## 2024-09-13 PROCEDURE — 3074F SYST BP LT 130 MM HG: CPT | Performed by: PODIATRIST

## 2024-09-13 PROCEDURE — 3078F DIAST BP <80 MM HG: CPT | Performed by: PODIATRIST

## 2024-09-13 PROCEDURE — 99024 POSTOP FOLLOW-UP VISIT: CPT | Performed by: PODIATRIST

## 2024-09-13 RX ORDER — ASPIRIN 81 MG/1
81 TABLET ORAL
COMMUNITY

## 2024-09-13 RX ORDER — BUPROPION HYDROCHLORIDE 300 MG/1
1 TABLET ORAL EVERY MORNING
COMMUNITY

## 2024-09-19 ENCOUNTER — OFFICE VISIT (OUTPATIENT)
Age: 79
End: 2024-09-19
Payer: MEDICARE

## 2024-09-19 ENCOUNTER — TELEPHONE (OUTPATIENT)
Age: 79
End: 2024-09-19

## 2024-09-19 VITALS
HEART RATE: 78 BPM | OXYGEN SATURATION: 100 % | WEIGHT: 187.3 LBS | SYSTOLIC BLOOD PRESSURE: 127 MMHG | DIASTOLIC BLOOD PRESSURE: 66 MMHG | BODY MASS INDEX: 26.22 KG/M2 | HEIGHT: 71 IN

## 2024-09-19 DIAGNOSIS — Z79.4 TYPE 2 DIABETES MELLITUS WITH HYPERGLYCEMIA, WITH LONG-TERM CURRENT USE OF INSULIN: Primary | ICD-10-CM

## 2024-09-19 DIAGNOSIS — E11.65 TYPE 2 DIABETES MELLITUS WITH HYPERGLYCEMIA, WITH LONG-TERM CURRENT USE OF INSULIN: Primary | ICD-10-CM

## 2024-09-19 DIAGNOSIS — R73.09 HEMOGLOBIN A1C LESS THAN 7.0%: ICD-10-CM

## 2024-09-19 RX ORDER — EMPAGLIFLOZIN 25 MG/1
25 TABLET, FILM COATED ORAL DAILY
Qty: 90 TABLET | Refills: 1 | Status: SHIPPED | OUTPATIENT
Start: 2024-09-19 | End: 2025-03-18

## 2024-09-19 RX ORDER — INSULIN DEGLUDEC 100 U/ML
25 INJECTION, SOLUTION SUBCUTANEOUS DAILY
Qty: 30 ML | Refills: 1 | Status: SHIPPED | OUTPATIENT
Start: 2024-09-19 | End: 2025-03-18

## 2024-09-19 RX ORDER — SEMAGLUTIDE 1.34 MG/ML
1 INJECTION, SOLUTION SUBCUTANEOUS WEEKLY
Qty: 9 ML | Refills: 1 | Status: SHIPPED | OUTPATIENT
Start: 2024-09-19

## 2024-09-23 ENCOUNTER — OFFICE VISIT (OUTPATIENT)
Dept: PODIATRY | Facility: CLINIC | Age: 79
End: 2024-09-23
Payer: MEDICARE

## 2024-09-23 VITALS
DIASTOLIC BLOOD PRESSURE: 78 MMHG | HEART RATE: 86 BPM | HEIGHT: 71 IN | TEMPERATURE: 97.8 F | SYSTOLIC BLOOD PRESSURE: 134 MMHG | WEIGHT: 194 LBS | BODY MASS INDEX: 27.16 KG/M2 | OXYGEN SATURATION: 98 %

## 2024-09-23 DIAGNOSIS — M86.471 CHRONIC OSTEOMYELITIS OF RIGHT FOOT WITH DRAINING SINUS: ICD-10-CM

## 2024-09-23 DIAGNOSIS — E08.59 DIABETES MELLITUS DUE TO UNDERLYING CONDITION WITH OTHER CIRCULATORY COMPLICATIONS: Primary | ICD-10-CM

## 2024-09-23 PROCEDURE — 1159F MED LIST DOCD IN RCRD: CPT | Performed by: PODIATRIST

## 2024-09-23 PROCEDURE — 99024 POSTOP FOLLOW-UP VISIT: CPT | Performed by: PODIATRIST

## 2024-09-23 PROCEDURE — 3078F DIAST BP <80 MM HG: CPT | Performed by: PODIATRIST

## 2024-09-23 PROCEDURE — 3075F SYST BP GE 130 - 139MM HG: CPT | Performed by: PODIATRIST

## 2024-09-23 PROCEDURE — 1160F RVW MEDS BY RX/DR IN RCRD: CPT | Performed by: PODIATRIST

## 2024-09-27 ENCOUNTER — OFFICE VISIT (OUTPATIENT)
Dept: FAMILY MEDICINE CLINIC | Facility: CLINIC | Age: 79
End: 2024-09-27
Payer: MEDICARE

## 2024-09-27 VITALS
WEIGHT: 194.2 LBS | OXYGEN SATURATION: 98 % | HEART RATE: 81 BPM | TEMPERATURE: 97.4 F | SYSTOLIC BLOOD PRESSURE: 138 MMHG | BODY MASS INDEX: 27.1 KG/M2 | DIASTOLIC BLOOD PRESSURE: 69 MMHG

## 2024-09-27 DIAGNOSIS — E11.65 TYPE 2 DIABETES MELLITUS WITH HYPERGLYCEMIA, WITH LONG-TERM CURRENT USE OF INSULIN: Primary | ICD-10-CM

## 2024-09-27 DIAGNOSIS — S98.131A AMPUTATED TOE OF RIGHT FOOT: ICD-10-CM

## 2024-09-27 DIAGNOSIS — R41.3 MEMORY CHANGES: ICD-10-CM

## 2024-09-27 DIAGNOSIS — R26.9 GAIT ABNORMALITY: ICD-10-CM

## 2024-09-27 DIAGNOSIS — R53.81 PHYSICAL DECONDITIONING: ICD-10-CM

## 2024-09-27 DIAGNOSIS — Z79.4 TYPE 2 DIABETES MELLITUS WITH HYPERGLYCEMIA, WITH LONG-TERM CURRENT USE OF INSULIN: Primary | ICD-10-CM

## 2024-09-27 PROCEDURE — 90662 IIV NO PRSV INCREASED AG IM: CPT | Performed by: FAMILY MEDICINE

## 2024-09-27 PROCEDURE — 99214 OFFICE O/P EST MOD 30 MIN: CPT | Performed by: FAMILY MEDICINE

## 2024-09-27 PROCEDURE — G0008 ADMIN INFLUENZA VIRUS VAC: HCPCS | Performed by: FAMILY MEDICINE

## 2024-09-27 PROCEDURE — 3078F DIAST BP <80 MM HG: CPT | Performed by: FAMILY MEDICINE

## 2024-09-27 PROCEDURE — 3075F SYST BP GE 130 - 139MM HG: CPT | Performed by: FAMILY MEDICINE

## 2024-09-27 PROCEDURE — 1126F AMNT PAIN NOTED NONE PRSNT: CPT | Performed by: FAMILY MEDICINE

## 2024-09-27 PROCEDURE — 1160F RVW MEDS BY RX/DR IN RCRD: CPT | Performed by: FAMILY MEDICINE

## 2024-09-27 PROCEDURE — 1159F MED LIST DOCD IN RCRD: CPT | Performed by: FAMILY MEDICINE

## 2024-10-15 ENCOUNTER — OFFICE VISIT (OUTPATIENT)
Dept: FAMILY MEDICINE CLINIC | Facility: CLINIC | Age: 79
End: 2024-10-15
Payer: MEDICARE

## 2024-10-15 VITALS
TEMPERATURE: 98 F | HEART RATE: 83 BPM | BODY MASS INDEX: 26.03 KG/M2 | DIASTOLIC BLOOD PRESSURE: 68 MMHG | SYSTOLIC BLOOD PRESSURE: 118 MMHG | OXYGEN SATURATION: 100 % | WEIGHT: 186.5 LBS

## 2024-10-15 DIAGNOSIS — K21.9 GASTROESOPHAGEAL REFLUX DISEASE WITHOUT ESOPHAGITIS: ICD-10-CM

## 2024-10-15 DIAGNOSIS — Z79.4 TYPE 2 DIABETES MELLITUS WITH HYPERGLYCEMIA, WITH LONG-TERM CURRENT USE OF INSULIN: Primary | ICD-10-CM

## 2024-10-15 DIAGNOSIS — E11.65 TYPE 2 DIABETES MELLITUS WITH HYPERGLYCEMIA, WITH LONG-TERM CURRENT USE OF INSULIN: Primary | ICD-10-CM

## 2024-10-15 DIAGNOSIS — M79.2 NEUROPATHIC PAIN: ICD-10-CM

## 2024-10-15 PROCEDURE — 3074F SYST BP LT 130 MM HG: CPT | Performed by: FAMILY MEDICINE

## 2024-10-15 PROCEDURE — 1126F AMNT PAIN NOTED NONE PRSNT: CPT | Performed by: FAMILY MEDICINE

## 2024-10-15 PROCEDURE — 3078F DIAST BP <80 MM HG: CPT | Performed by: FAMILY MEDICINE

## 2024-10-15 PROCEDURE — 99214 OFFICE O/P EST MOD 30 MIN: CPT | Performed by: FAMILY MEDICINE

## 2024-10-15 RX ORDER — ESOMEPRAZOLE MAGNESIUM 40 MG/1
40 GRANULE, DELAYED RELEASE ORAL
Qty: 90 EACH | Refills: 1 | Status: SHIPPED | OUTPATIENT
Start: 2024-10-15 | End: 2025-04-13

## 2024-10-15 RX ORDER — OXYBUTYNIN CHLORIDE 5 MG/1
TABLET, EXTENDED RELEASE ORAL
COMMUNITY
Start: 2024-10-02

## 2024-10-15 NOTE — PROGRESS NOTES
Chief Complaint  Peripheral Neuropathy (PT would like to discuss neuropathy issues and possibly a referral )    Subjective      Josemanuel De La Fuente is a 79 y.o. male who presents to [facility]     Peripheral neuropathy.  Has diabetes which is well-controlled with last A1c of 6.30 when last checked on 7/25/2024.  Recently had right third toe amputated on 9/3/2024 due to chronic osteomyelitis. B12 level was normal in July. He's had neuropathy in both hands and feet, maybe a bit worse than since before his toe amputation.    He's working on getting in with a neurologist for his memory.  I placed a referral at his last appointment but he said he cannot get until January.  He is going to keep this appointment but he is [facility] to see if they can get him in sooner which I agreed was reasonable.    He is due for tdap and RSV vaccinations - he is going to get these at his local pharmacy.  He wanted to ask if I recommend them and I told him I do recommend both of those strongly.    Objective   Vital Signs:   Vitals:    10/15/24 1433   BP: 118/68   Pulse: 83   Temp: 98 °F (36.7 °C)   SpO2: 100%   Weight: 84.6 kg (186 lb 8 oz)     Body mass index is 26.03 kg/m².    Wt Readings from Last 3 Encounters:   10/15/24 84.6 kg (186 lb 8 oz)   09/27/24 88.1 kg (194 lb 3.2 oz)   09/23/24 88 kg (194 lb)     BP Readings from Last 3 Encounters:   10/15/24 118/68   09/27/24 138/69   09/23/24 134/78       Health Maintenance   Topic Date Due    DIABETIC EYE EXAM  Never done    TDAP/TD VACCINES (1 - Tdap) Never done    ZOSTER VACCINE (1 of 2) Never done    RSV Vaccine - Adults (1 - 1-dose 75+ series) Never done    COVID-19 Vaccine (5 - 2023-24 season) 09/01/2024    HEMOGLOBIN A1C  01/25/2025    ANNUAL WELLNESS VISIT  07/25/2025    LIPID PANEL  07/25/2025    URINE MICROALBUMIN  07/25/2025    LUNG CANCER SCREENING  08/05/2025    BMI FOLLOWUP  09/27/2025    HEPATITIS C  SCREENING  Completed    INFLUENZA VACCINE  Completed    Pneumococcal Vaccine 65+  Completed       Physical Exam  Vitals and nursing note reviewed.   Constitutional:       General: He is not in acute distress.  Cardiovascular:      Rate and Rhythm: Normal rate and regular rhythm.      Heart sounds: Normal heart sounds.   Pulmonary:      Effort: Pulmonary effort is normal. No respiratory distress.      Breath sounds: Normal breath sounds.   Neurological:      Mental Status: He is alert.   Psychiatric:         Mood and Affect: Mood normal.         Behavior: Behavior normal.          Result Review :  The following data was reviewed by: Tino Roach MD on 10/15/2024:         Procedures          Assessment & Plan  Type 2 diabetes mellitus with hyperglycemia, with long-term current use of insulin  Diabetes well controlled, not due for A1c yet. Recheck A1c in 1-3 months.  Neuropathic pain  He does not want to start anything for neuropathy unless things get worse - we discussed that treating with something such as duloxetine or gabapentin would only treat his symptoms. Controlling his diabetes is key to preventing worsening of the diabetes, and his diabetes is well controlled.  Gastroesophageal reflux disease without esophagitis  Refilled his esomeprazole     New Medications Ordered This Visit   Medications    esomeprazole (nexIUM) 40 MG packet     Sig: Take 40 mg by mouth Every Morning Before Breakfast for 180 days.     Dispense:  90 each     Refill:  1                    FOLLOW UP  Return if symptoms worsen or fail to improve.  Patient was given instructions and counseling regarding his condition or for health maintenance advice. Please see specific information pulled into the AVS if appropriate.       Tino Roach MD  10/15/24  14:54 EDT    CURRENT & DISCONTINUED MEDICATIONS  Current Outpatient Medications   Medication Instructions    albuterol sulfate  (90 Base) MCG/ACT inhaler Inhale 2 puffs Every 4  (Four) Hours As Needed for Wheezing or Shortness of Air.    ALPHA LIPOIC ACID PO 1 capsule, Daily    aspirin 81 mg    atorvastatin (LIPITOR) 80 MG tablet Take 1 tablet by mouth Every Night.    BD Pen Needle Nubia U/F 32G X 4 MM misc     buPROPion XL (WELLBUTRIN XL) 300 MG 24 hr tablet 1 tablet, Every Morning    clopidogrel (PLAVIX) 75 MG tablet Take 1 tablet by mouth Daily.    cyanocobalamin 1000 MCG/ML injection 1 mL Every 28 (Twenty-Eight) Days.    ELDERBERRY PO Take  by mouth.    Entresto  MG tablet Take 1 tablet by mouth 2 (Two) Times a Day.    esomeprazole (NEXIUM) 40 mg, Oral, Every Morning Before Breakfast    finasteride (PROSCAR) 5 MG tablet Take 1 tablet by mouth Daily.    FREESTYLE LITE test strip     HYDROcodone-acetaminophen (Norco) 7.5-325 MG per tablet 1 tablet, Oral, Every 6 Hours PRN    Jardiance 25 mg, Oral, Daily    Lancets (freestyle) lancets     metoprolol succinate XL (TOPROL-XL) 100 MG 24 hr tablet Take 1 tablet by mouth 2 (Two) Times a Day.    oxybutynin XL (DITROPAN-XL) 5 MG 24 hr tablet     Ozempic (1 MG/DOSE) 1 mg, Subcutaneous, Weekly    tamsulosin (FLOMAX) 0.4 MG capsule 24 hr capsule Take 1 capsule by mouth Daily.    Trelegy Ellipta 100-62.5-25 MCG/ACT inhaler 1 puff, Inhalation, Daily - RT    Tresiba FlexTouch 25 Units, Subcutaneous, Daily       There are no discontinued medications.

## 2024-10-15 NOTE — ASSESSMENT & PLAN NOTE
He does not want to start anything for neuropathy unless things get worse - we discussed that treating with something such as duloxetine or gabapentin would only treat his symptoms. Controlling his diabetes is key to preventing worsening of the diabetes, and his diabetes is well controlled.

## 2024-10-18 ENCOUNTER — OFFICE VISIT (OUTPATIENT)
Dept: CARDIOLOGY | Facility: CLINIC | Age: 79
End: 2024-10-18
Payer: MEDICARE

## 2024-10-18 VITALS
WEIGHT: 188 LBS | BODY MASS INDEX: 26.32 KG/M2 | HEART RATE: 74 BPM | DIASTOLIC BLOOD PRESSURE: 63 MMHG | SYSTOLIC BLOOD PRESSURE: 134 MMHG | HEIGHT: 71 IN

## 2024-10-18 DIAGNOSIS — I25.5 ISCHEMIC CARDIOMYOPATHY: ICD-10-CM

## 2024-10-18 DIAGNOSIS — I10 PRIMARY HYPERTENSION: ICD-10-CM

## 2024-10-18 DIAGNOSIS — E78.2 MIXED HYPERLIPIDEMIA: ICD-10-CM

## 2024-10-18 DIAGNOSIS — I25.10 CAD S/P PERCUTANEOUS CORONARY ANGIOPLASTY: Primary | ICD-10-CM

## 2024-10-18 DIAGNOSIS — Z98.61 CAD S/P PERCUTANEOUS CORONARY ANGIOPLASTY: Primary | ICD-10-CM

## 2024-10-18 DIAGNOSIS — I73.9 PAD (PERIPHERAL ARTERY DISEASE): ICD-10-CM

## 2024-10-18 NOTE — ASSESSMENT & PLAN NOTE
Patient will remain stable clinically as far as breathing ability no evidence of volume load on exam continue with Jardiance 25 daily, Toprol 100 twice daily, and Entresto 97/103 twice daily dosings

## 2024-10-18 NOTE — PROGRESS NOTES
Chief Complaint  Establish Care, Hypertension, Hyperlipidemia, and Coronary Artery Disease      History of Present Illness  Josemanuel De La Fuente presents to [facility]  Patient is a 79-year-old with CAD with prior stenting known chronic occlusions, ischemic cardiomyopathy EF of 35 to 40%, essential retention, dyslipidemia, and peripheral vas disease he is recently moved here from [place] clinically the patient has not had any new issues denies any anginal chest pain.  He is in no change overall in his breathing ability he does have some limited mobility issues but still gets around with a cane.  He has not been having problems with lower extremity PND orthopnea.  No syncope or presyncopal episodes.  Past Medical History:   Diagnosis Date    Chronic osteomyelitis     RIGHT FOOT    COPD (chronic obstructive pulmonary disease)     Coronary artery disease     REPORTS HAS APPT WITH DR SUGGS 10/2024. REPORTS HAD SEEN CARDIOLOGY [facility] IN [place] DR YURIY CONWAY DENIES CP/SOA. DECREASED ACTIVITY D/T RIGHT 3 RD TOE    Diabetes mellitus     Foot ulcer     GERD (gastroesophageal reflux disease)     Hyperlipidemia     Hypertension     Low back pain     PAD (peripheral artery disease)     DCB LEFT LEFT MID SFA  AND TASHA 10/20/21    Pneumonia     Seizures     LAST SEIZURE WAS OVER 50-60 YEARS AGO         Current Outpatient Medications:     albuterol sulfate  (90 Base) MCG/ACT inhaler, Inhale 2 puffs Every 4 (Four) Hours As Needed for Wheezing or Shortness of Air., Disp: , Rfl:     ALPHA LIPOIC ACID PO, Take 1 capsule by mouth Daily., Disp: , Rfl:     aspirin 81 MG EC tablet, Take 1 tablet by mouth., Disp: , Rfl:     atorvastatin (LIPITOR) 80 MG tablet, Take 1 tablet by mouth Every Night., Disp: , Rfl:     BD Pen Needle Nubia U/F 32G X 4 MM misc, , Disp: , Rfl:     buPROPion XL (WELLBUTRIN XL) 300 MG 24 hr tablet, Take 1 tablet by mouth Every Morning., Disp: , Rfl:     clopidogrel (PLAVIX) 75 MG  tablet, Take 1 tablet by mouth Daily., Disp: , Rfl:     cyanocobalamin 1000 MCG/ML injection, 1 mL Every 28 (Twenty-Eight) Days., Disp: , Rfl:     ELDERBERRY PO, Take  by mouth., Disp: , Rfl:     Entresto  MG tablet, Take 1 tablet by mouth 2 (Two) Times a Day., Disp: , Rfl:     esomeprazole (nexIUM) 40 MG packet, Take 40 mg by mouth Every Morning Before Breakfast for 180 days., Disp: 90 each, Rfl: 1    finasteride (PROSCAR) 5 MG tablet, Take 1 tablet by mouth Daily., Disp: , Rfl:     FREESTYLE LITE test strip, , Disp: , Rfl:     Jardiance 25 MG tablet tablet, Take 1 tablet by mouth Daily for 180 days., Disp: 90 tablet, Rfl: 1    Lancets (freestyle) lancets, , Disp: , Rfl:     metoprolol succinate XL (TOPROL-XL) 100 MG 24 hr tablet, Take 1 tablet by mouth 2 (Two) Times a Day., Disp: , Rfl:     oxybutynin XL (DITROPAN-XL) 5 MG 24 hr tablet, , Disp: , Rfl:     Ozempic, 1 MG/DOSE, 4 MG/3ML solution pen-injector, Inject 1 mg under the skin into the appropriate area as directed 1 (One) Time Per Week., Disp: 9 mL, Rfl: 1    tamsulosin (FLOMAX) 0.4 MG capsule 24 hr capsule, Take 1 capsule by mouth Daily., Disp: , Rfl:     Trelegy Ellipta 100-62.5-25 MCG/ACT inhaler, Inhale 1 puff Daily for 180 days., Disp: 60 each, Rfl: 2    Tresiba FlexTouch 100 UNIT/ML solution pen-injector injection, Inject 25 Units under the skin into the appropriate area as directed Daily for 180 days., Disp: 30 mL, Rfl: 1    HYDROcodone-acetaminophen (Norco) 7.5-325 MG per tablet, Take 1 tablet by mouth Every 6 (Six) Hours As Needed for Moderate Pain. (Patient not taking: Reported on 10/18/2024), Disp: 30 tablet, Rfl: 0    There are no discontinued medications.  No Known Allergies     Social History     Tobacco Use    Smoking status: Every Day     Current packs/day: 1.00     Average packs/day: 1 pack/day for 62.1 years (62.1 ttl pk-yrs)     Types: Cigarettes     Start date: 9/15/1962     Passive exposure: Current    Smokeless tobacco: Never     "Tobacco comments:     REPORTS HAS BEEN ATTEMPTING TO QUIT, LAST 9/3/24 0700   Vaping Use    Vaping status: Never Used   Substance Use Topics    Alcohol use: Not Currently    Drug use: Never       History reviewed. No pertinent family history.     Objective     /63   Pulse 74   Ht 180.3 cm (70.98\")   Wt 85.3 kg (188 lb)   BMI 26.24 kg/m²       Physical Exam    General Appearance:   no acute distress  Alert and oriented x3  HENT:   lips not cyanotic  Atraumatic  Neck:  No jvd   supple  Respiratory:  no respiratory distress  normal breath sounds  no rales  Cardiovascular:  Regular rate and rhythm  no S3, no S4   no murmur  no rub  Extremities  No cyanosis  lower extremity edema: none    Skin:   warm, dry  No rashes    Result Review :     No results found for: \"PROBNP\"  CMP          7/25/2024    16:58   CMP   Glucose 121    BUN 13    Creatinine 0.86    EGFR 89.7    Sodium 141    Potassium 4.2    Chloride 106    Calcium 8.5    Total Protein 6.6    Albumin 4.0    Globulin 2.6    Total Bilirubin 0.4    Alkaline Phosphatase 70    AST (SGOT) 25    ALT (SGPT) 27    Albumin/Globulin Ratio 1.5    BUN/Creatinine Ratio 15.1    Anion Gap 11.4      CBC w/diff          7/25/2024    16:58   CBC w/Diff   WBC 10.39    RBC 4.78    Hemoglobin 16.0    Hematocrit 47.8    .0    MCH 33.5    MCHC 33.5    RDW 13.2    Platelets 150    Neutrophil Rel % 74.6    Immature Granulocyte Rel % 0.4    Lymphocyte Rel % 15.0    Monocyte Rel % 8.1    Eosinophil Rel % 1.4    Basophil Rel % 0.5       Lab Results   Component Value Date    TSH 0.882 07/25/2024      No results found for: \"FREET4\"   No results found for: \"DDIMERQUANT\"  No results found for: \"MG\"   No results found for: \"DIGOXIN\"   No results found for: \"TROPONINT\"   No results found for: \"POCTROP\"(       Lipid Panel          7/25/2024    16:58   Lipid Panel   Total Cholesterol 103    Triglycerides 54    HDL Cholesterol 48    VLDL Cholesterol 13    LDL Cholesterol  42    LDL/HDL " Ratio 0.92           ECG 12 Lead    Date/Time: 10/18/2024 11:42 AM  Performed by: Dar Bean MD    Authorized by: Dar Bean MD  Comparison: compared with previous ECG   Similar to previous ECG  Rhythm: sinus rhythm         No results found for this or any previous visit.           Results for orders placed during the hospital encounter of 08/26/24    Doppler Ankle Brachial Index Single Level CAR    Interpretation Summary    Right Conclusion: The right SERGIO is normal. Mild digital insufficiency.    Left Conclusion: The left SERGIO is normal. Normal digital pressures.    The ASCVD Risk score (Fernando DK, et al., 2019) failed to calculate for the following reasons:    The valid total cholesterol range is 130 to 320 mg/dL     Diagnoses and all orders for this visit:    1. CAD S/P percutaneous coronary angioplasty (Primary)  Overview:  Occlusive restenosis of long Diag 1 - treat medically; moderate disease in LCx and RCA would recommend treating conservatively as well unless the patient develops significant angina.     TASHA dRCA- Angina 04/2019 - TASHA in D1 (dual LAD)   - Failed meds: Ticagrelor (Dyspnea    Assessment & Plan:  Patient is doing well no ongoing angina continue with chronic aspirin 81 mg daily and Plavix 75 daily        2. Primary hypertension  Assessment & Plan:  Blood pressure at goal range continue with current medications       3. Ischemic cardiomyopathy  Assessment & Plan:  Patient will remain stable clinically as far as breathing ability no evidence of volume load on exam continue with Jardiance 25 daily, Toprol 100 twice daily, and Entresto 97/103 twice daily dosings      4. Mixed hyperlipidemia  Assessment & Plan:  Patient with LDL below 55 continue with Lipitor 80 nightly       5. PAD (peripheral artery disease)  Overview:  DCB LEFT LEFT MID [facility]FA  AND TASHA 10/20/21              Follow Up     Return in about 6 months (around 4/18/2025).          Patient was given instructions and counseling  regarding his condition or for health maintenance advice. Please see specific information pulled into the AVS if appropriate.

## 2024-11-14 ENCOUNTER — OFFICE VISIT (OUTPATIENT)
Dept: UROLOGY | Age: 79
End: 2024-11-14
Payer: MEDICARE

## 2024-11-14 VITALS
DIASTOLIC BLOOD PRESSURE: 76 MMHG | WEIGHT: 188 LBS | BODY MASS INDEX: 26.92 KG/M2 | SYSTOLIC BLOOD PRESSURE: 129 MMHG | HEIGHT: 70 IN

## 2024-11-14 DIAGNOSIS — N40.0 BENIGN PROSTATIC HYPERPLASIA WITHOUT LOWER URINARY TRACT SYMPTOMS: Primary | ICD-10-CM

## 2024-11-14 PROBLEM — Z72.0 TOBACCO USE: Status: RESOLVED | Noted: 2020-08-06 | Resolved: 2024-11-14

## 2024-11-14 PROBLEM — Z95.5 STATUS POST CORONARY ARTERY STENT PLACEMENT: Status: RESOLVED | Noted: 2019-04-27 | Resolved: 2024-11-14

## 2024-11-14 PROBLEM — Z95.5 STENTED CORONARY ARTERY: Status: RESOLVED | Noted: 2019-03-14 | Resolved: 2024-11-14

## 2024-11-14 PROBLEM — I73.9 CLAUDICATION, CLASS III: Status: ACTIVE | Noted: 2020-08-03

## 2024-11-14 PROBLEM — F43.21 ADJUSTMENT DISORDER WITH DEPRESSED MOOD: Status: ACTIVE | Noted: 2023-02-17

## 2024-11-14 PROBLEM — M54.50 LOW BACK PAIN: Status: ACTIVE | Noted: 2018-08-17

## 2024-11-14 PROBLEM — M79.673 FOOT PAIN: Status: ACTIVE | Noted: 2019-01-07

## 2024-11-14 PROBLEM — E11.42 DIABETIC POLYNEUROPATHY ASSOCIATED WITH TYPE 2 DIABETES MELLITUS: Status: ACTIVE | Noted: 2018-02-13

## 2024-11-14 PROBLEM — Z72.0 TOBACCO USE: Status: ACTIVE | Noted: 2020-08-06

## 2024-11-14 PROBLEM — R94.39 ABNORMAL NUCLEAR STRESS TEST: Status: ACTIVE | Noted: 2019-01-29

## 2024-11-14 PROBLEM — E11.8 TYPE 2 DIABETES MELLITUS WITH COMPLICATION, WITH LONG-TERM CURRENT USE OF INSULIN: Status: ACTIVE | Noted: 2018-12-10

## 2024-11-14 PROBLEM — S98.131A AMPUTATION OF TOE OF RIGHT FOOT: Status: ACTIVE | Noted: 2024-09-05

## 2024-11-14 PROBLEM — Z95.5 STATUS POST CORONARY ARTERY STENT PLACEMENT: Status: ACTIVE | Noted: 2019-04-27

## 2024-11-14 PROBLEM — G58.8 INTERCOSTAL NEURALGIA: Status: ACTIVE | Noted: 2019-05-03

## 2024-11-14 PROBLEM — Z98.61 CAD S/P PERCUTANEOUS CORONARY ANGIOPLASTY: Status: RESOLVED | Noted: 2024-07-25 | Resolved: 2024-11-14

## 2024-11-14 PROBLEM — I25.10 CAD S/P PERCUTANEOUS CORONARY ANGIOPLASTY: Status: RESOLVED | Noted: 2024-07-25 | Resolved: 2024-11-14

## 2024-11-14 PROBLEM — I70.213 ATHEROSCLEROSIS OF NATIVE ARTERY OF BOTH LOWER EXTREMITIES WITH INTERMITTENT CLAUDICATION: Status: ACTIVE | Noted: 2021-12-01

## 2024-11-14 PROBLEM — Z95.5 STENTED CORONARY ARTERY: Status: ACTIVE | Noted: 2019-03-14

## 2024-11-14 PROBLEM — Z79.4 TYPE 2 DIABETES MELLITUS WITH COMPLICATION, WITH LONG-TERM CURRENT USE OF INSULIN: Status: ACTIVE | Noted: 2018-12-10

## 2024-11-14 PROBLEM — R94.39 ABNORMAL NUCLEAR STRESS TEST: Status: RESOLVED | Noted: 2019-01-29 | Resolved: 2024-11-14

## 2024-11-14 LAB
BILIRUB BLD-MCNC: NEGATIVE MG/DL
CLARITY, POC: CLEAR
COLOR UR: YELLOW
EXPIRATION DATE: ABNORMAL
GLUCOSE UR STRIP-MCNC: ABNORMAL MG/DL
KETONES UR QL: NEGATIVE
LEUKOCYTE EST, POC: NEGATIVE
Lab: ABNORMAL
NITRITE UR-MCNC: NEGATIVE MG/ML
PH UR: 6.5 [PH] (ref 5–8)
PROT UR STRIP-MCNC: NEGATIVE MG/DL
RBC # UR STRIP: NEGATIVE /UL
SP GR UR: 1.01 (ref 1–1.03)
SPECIMEN VOL 24H UR: 146 L
UROBILINOGEN UR QL: ABNORMAL

## 2024-11-14 RX ORDER — TAMSULOSIN HYDROCHLORIDE 0.4 MG/1
2 CAPSULE ORAL DAILY
Qty: 180 CAPSULE | Refills: 3 | Status: SHIPPED | OUTPATIENT
Start: 2024-11-14

## 2024-11-14 NOTE — PROGRESS NOTES
Chief Complaint: Benign Prostatic Hypertrophy    Subjective         History of Present Illness  Josemanuel De La Fuente is a 79 y.o. male presents to [facility] to be seen for BPH.    The patient was seeing a urology group in [place] before.     He was seen in 2018 for sepsis and cellulitis in the groin with an I &D of the scrotum performed and subsequent nonhealing wound requiring hyperbaric oxygen therapy and wound care.    He was on tamsulosin 0.4 mg q day and finasteride 5mg  has been on this for several years.     He was on tamsuloisn 0.8 mg q day but he decreased this back down to 0.4     He is also noted to be on oxybutynin for OAB symptoms as well.    Reports issues with memory he also reports that he has issues with dry mouth.    His Pvr is 145 in office today.    Stream is weak.    He states issues with urgency.     Frequency with urination as well.    Nocturia x 1    He drinks a lot of caffeine 2 pots of coffee a day.    Last a 1 c was 6.3 3 mos ago.    No family hx of  malignancies.    He is a smoker smokes over 1 ppd x 60 years      Objective     Past Medical History:   Diagnosis Date    Chronic osteomyelitis     RIGHT FOOT    COPD (chronic obstructive pulmonary disease)     Coronary artery disease     REPORTS HAS APPT WITH DR SUGGS 10/2024. REPORTS HAD SEEN CARDIOLOGY [facility] IN [place] DR YURIY CONWAY DENIES CP/SOA. DECREASED ACTIVITY D/T RIGHT 3 RD TOE    Diabetes mellitus     Foot ulcer     GERD (gastroesophageal reflux disease)     Hyperlipidemia     Hypertension     Low back pain     PAD (peripheral artery disease)     DCB LEFT LEFT MID [facility]FA  AND TASHA 10/20/21    Pneumonia     Seizures     LAST SEIZURE WAS OVER 50-60 YEARS AGO       Past Surgical History:   Procedure Laterality Date    AMPUTATION DIGIT Right 9/3/2024    Procedure: AMPUTATION DIGIT RIGHT 3RD TOE;  Surgeon: Alton Fox DPM;  Location: [facility] MAIN OR;  Service: Podiatry;  Laterality: Right;    ELBOW  PROCEDURE Left     EYE SURGERY Bilateral     KNEE SURGERY Bilateral     ARTHROSCOPY    TONSILLECTOMY           Current Outpatient Medications:     albuterol sulfate  (90 Base) MCG/ACT inhaler, Inhale 2 puffs Every 4 (Four) Hours As Needed for Wheezing or Shortness of Air., Disp: , Rfl:     ALPHA LIPOIC ACID PO, Take 1 capsule by mouth Daily., Disp: , Rfl:     aspirin 81 MG EC tablet, Take 1 tablet by mouth., Disp: , Rfl:     atorvastatin (LIPITOR) 80 MG tablet, Take 1 tablet by mouth Every Night., Disp: , Rfl:     BD Pen Needle Nubia U/F 32G X 4 MM misc, , Disp: , Rfl:     buPROPion XL (WELLBUTRIN XL) 300 MG 24 hr tablet, Take 1 tablet by mouth Every Morning., Disp: , Rfl:     clopidogrel (PLAVIX) 75 MG tablet, Take 1 tablet by mouth Daily., Disp: , Rfl:     cyanocobalamin 1000 MCG/ML injection, 1 mL Every 28 (Twenty-Eight) Days., Disp: , Rfl:     ELDERBERRY PO, Take  by mouth., Disp: , Rfl:     Entresto  MG tablet, Take 1 tablet by mouth 2 (Two) Times a Day., Disp: , Rfl:     esomeprazole (nexIUM) 40 MG packet, Take 40 mg by mouth Every Morning Before Breakfast for 180 days., Disp: 90 each, Rfl: 1    finasteride (PROSCAR) 5 MG tablet, Take 1 tablet by mouth Daily., Disp: , Rfl:     FREESTYLE LITE test strip, , Disp: , Rfl:     Jardiance 25 MG tablet tablet, Take 1 tablet by mouth Daily for 180 days., Disp: 90 tablet, Rfl: 1    Lancets (freestyle) lancets, , Disp: , Rfl:     metoprolol succinate XL (TOPROL-XL) 100 MG 24 hr tablet, Take 1 tablet by mouth 2 (Two) Times a Day., Disp: , Rfl:     Ozempic, 1 MG/DOSE, 4 MG/3ML solution pen-injector, Inject 1 mg under the skin into the appropriate area as directed 1 (One) Time Per Week., Disp: 9 mL, Rfl: 1    tamsulosin (FLOMAX) 0.4 MG capsule 24 hr capsule, Take 2 capsules by mouth Daily., Disp: 180 capsule, Rfl: 3    Trelegy Ellipta 100-62.5-25 MCG/ACT inhaler, Inhale 1 puff Daily for 180 days., Disp: 60 each, Rfl: 2    Tresiba FlexTouch 100 UNIT/ML solution  "pen-injector injection, Inject 25 Units under the skin into the appropriate area as directed Daily for 180 days., Disp: 30 mL, Rfl: 1    No Known Allergies     History reviewed. No pertinent family history.    Social History     Socioeconomic History    Marital status:    Tobacco Use    Smoking status: Every Day     Current packs/day: 1.00     Average packs/day: 1 pack/day for 62.2 years (62.2 ttl pk-yrs)     Types: Cigarettes     Start date: 9/15/1962     Passive exposure: Current    Smokeless tobacco: Never    Tobacco comments:     REPORTS HAS BEEN ATTEMPTING TO QUIT, LAST 9/3/24 0700   Vaping Use    Vaping status: Never Used   Substance and Sexual Activity    Alcohol use: Not Currently    Drug use: Never    Sexual activity: Defer       Vital Signs:   /76   Ht 177.8 cm (70\")   Wt 85.3 kg (188 lb)   BMI 26.98 kg/m²      Physical Exam     Result Review :   The following data was reviewed by: JENNI Burgos on 11/14/2024:  Results for orders placed or performed in visit on 11/14/24   Bladder Scan    Collection Time: 11/14/24  2:34 PM   Result Value Ref Range    Volume 146    POC Urinalysis Dipstick, Automated    Collection Time: 11/14/24  2:35 PM    Specimen: Urine   Result Value Ref Range    Color Yellow Yellow, Straw, Dark Yellow, Jody    Clarity, UA Clear Clear    Specific Gravity  1.010 1.005 - 1.030    pH, Urine 6.5 5.0 - 8.0    Leukocytes Negative Negative    Nitrite, UA Negative Negative    Protein, POC Negative Negative mg/dL    Glucose, UA >=1000 mg/dL (3+) (A) Negative mg/dL    Ketones, UA Negative Negative    Urobilinogen, UA 0.2 E.U./dL Normal, 0.2 E.U./dL    Bilirubin Negative Negative    Blood, UA Negative Negative    Lot Number [id number]     Expiration Date 9/2,025       Bladder Scan interpretation 11/14/2024    Estimation of residual urine via BVI 3000 Verathon Bladder Scan  MA/nurse performing: nina [email or web address]  Residual Urine: 146 ml  Indication: Benign prostatic hyperplasia " without lower urinary tract symptoms   Position: Supine  Examination: Incremental scanning of the suprapubic area using 2.0 MHz transducer using copious amounts of acoustic gel.   Findings: An anechoic area was demonstrated which represented the bladder, with measurement of residual urine as noted. I inspected this myself. In that the residual urine was stable or insignificant, refer to plan for treatment and plan necessary at this time.            Procedures        Assessment and Plan    Diagnoses and all orders for this visit:    1. Benign prostatic hyperplasia without lower urinary tract symptoms (Primary)  -     Bladder Scan  -     POC Urinalysis Dipstick, Automated  -     tamsulosin (FLOMAX) 0.4 MG capsule 24 hr capsule; Take 2 capsules by mouth Daily.  Dispense: 180 capsule; Refill: 3        Recommended decreased caffeine intake.    Will have him try increasing tamsulosin since again.    We discussed procedures today.    Patient may potentially be interested in aqua ablation.    Ipss 22 qol score 3     He will call the office if he would like to proceed with further testing for aqua ablation.      I spent 36 minutes caring for Josemanuel on this date of service. This time includes time spent by me in the following activities:reviewing tests, obtaining and/or reviewing a separately obtained history, performing a medically appropriate examination and/or evaluation , counseling and educating the patient/family/caregiver, ordering medications, tests, or procedures, and documenting information in the medical record  Follow Up   Return in about 3 months (around 2/14/2025).  Patient was given instructions and counseling regarding his condition or for health maintenance advice. Please see specific information pulled into the AVS if appropriate.         This document has been electronically signed by JENNI Burgos  November 14, 2024 15:26 EST

## 2024-12-19 ENCOUNTER — OFFICE VISIT (OUTPATIENT)
Age: 79
End: 2024-12-19
Payer: MEDICARE

## 2024-12-19 VITALS
HEART RATE: 87 BPM | SYSTOLIC BLOOD PRESSURE: 131 MMHG | DIASTOLIC BLOOD PRESSURE: 69 MMHG | WEIGHT: 191.2 LBS | BODY MASS INDEX: 27.37 KG/M2 | HEIGHT: 70 IN | OXYGEN SATURATION: 99 %

## 2024-12-19 DIAGNOSIS — E11.22 CONTROLLED TYPE 2 DIABETES MELLITUS WITH STAGE 2 CHRONIC KIDNEY DISEASE, WITH LONG-TERM CURRENT USE OF INSULIN: ICD-10-CM

## 2024-12-19 DIAGNOSIS — R73.09 HEMOGLOBIN A1C LESS THAN 7.0%: ICD-10-CM

## 2024-12-19 DIAGNOSIS — E11.65 CONTROLLED TYPE 2 DIABETES MELLITUS WITH HYPERGLYCEMIA, WITH LONG-TERM CURRENT USE OF INSULIN: Primary | ICD-10-CM

## 2024-12-19 DIAGNOSIS — Z79.4 CONTROLLED TYPE 2 DIABETES MELLITUS WITH STAGE 2 CHRONIC KIDNEY DISEASE, WITH LONG-TERM CURRENT USE OF INSULIN: ICD-10-CM

## 2024-12-19 DIAGNOSIS — Z79.4 CONTROLLED TYPE 2 DIABETES MELLITUS WITH DIABETIC POLYNEUROPATHY, WITH LONG-TERM CURRENT USE OF INSULIN: ICD-10-CM

## 2024-12-19 DIAGNOSIS — E11.42 CONTROLLED TYPE 2 DIABETES MELLITUS WITH DIABETIC POLYNEUROPATHY, WITH LONG-TERM CURRENT USE OF INSULIN: ICD-10-CM

## 2024-12-19 DIAGNOSIS — N18.2 CONTROLLED TYPE 2 DIABETES MELLITUS WITH STAGE 2 CHRONIC KIDNEY DISEASE, WITH LONG-TERM CURRENT USE OF INSULIN: ICD-10-CM

## 2024-12-19 DIAGNOSIS — Z79.4 CONTROLLED TYPE 2 DIABETES MELLITUS WITH HYPERGLYCEMIA, WITH LONG-TERM CURRENT USE OF INSULIN: Primary | ICD-10-CM

## 2024-12-19 PROCEDURE — 3075F SYST BP GE 130 - 139MM HG: CPT

## 2024-12-19 PROCEDURE — 99214 OFFICE O/P EST MOD 30 MIN: CPT

## 2024-12-19 PROCEDURE — G2211 COMPLEX E/M VISIT ADD ON: HCPCS

## 2024-12-19 PROCEDURE — 1160F RVW MEDS BY RX/DR IN RCRD: CPT

## 2024-12-19 PROCEDURE — 3078F DIAST BP <80 MM HG: CPT

## 2024-12-19 PROCEDURE — 1159F MED LIST DOCD IN RCRD: CPT

## 2024-12-19 NOTE — PROGRESS NOTES
Chief Complaint  Diabetes (Follow up, med mgt)    Referred By: No ref. provider found    Subjective          Josemanuel De La Fuente presents to [facility] for diabetes medication management    History of Present Illness    Visit type:  follow-up  Diabetes type:  Type 2  Current diabetes status/concerns/issues:  Blood sugars have been a little higher than they normally are, but I know what causes it  Other health concerns: No new health concerns  Current Diabetes symptoms:    Polyuria: Yes attributed to Jardiance   [facility]: Yes [facility]   Polyphagia: No   Blurred vision: Yes previous surgery for cataracts bilateral   Excessive fatigue: No  Known Diabetes complications:  Neuropathy: Numbness; Location: Feet, Hands, and Bilateral  Renal: Stage II mild (GFR = 60-89 mL/min) and Microalbuminuria - NEGATIVE  Eyes: No current eye exam available in record; Location: N/A  Amputation/Wounds:  Right third toe  GI: Reflux and Indigestion  Cardiovascular: Hypertension, Hyperlipidemia, and CAD  ED: Patient Reported  Other: None  Hypoglycemia:  None reported at this time  Hypoglycemia Symptoms:  No hypoglycemia at this time  Current diabetes treatment:  Tresiba 25 units daily, Ozempic 1 mg weekly, Jardiance 25 mg daily  Blood glucose device:  Meter  Blood glucose monitoring frequency:  2 -3  Blood glucose range/average:   90s - 150s consistently  Glucose Source: Device Reviewed  Diet:  Limits high carb/sweet foods, Avoids sugary drinks, Number of meals each day - 1-2; Number of snacks each day - occasional  Activity/Exercise:   as active as possible    Past Medical History:   Diagnosis Date    Chronic osteomyelitis     RIGHT FOOT    COPD (chronic obstructive pulmonary disease)     Coronary artery disease     REPORTS HAS APPT WITH DR SUGGS 10/2024. REPORTS HAD SEEN CARDIOLOGY [facility] IN [place] DR YURIY TRUJILLO CP/SOA. DECREASED ACTIVITY D/T RIGHT 3 RD TOE    Diabetes mellitus     Foot ulcer      GERD (gastroesophageal reflux disease)     Hyperlipidemia     Hypertension     Low back pain     PAD (peripheral artery disease)     DCB LEFT LEFT MID SFA  AND TASHA 10/20/21    Pneumonia     Seizures     LAST SEIZURE WAS OVER 50-60 YEARS AGO     Past Surgical History:   Procedure Laterality Date    AMPUTATION DIGIT Right 9/3/2024    Procedure: AMPUTATION DIGIT RIGHT 3RD TOE;  Surgeon: Alton Fox DPM;  Location: [facility];  Service: Podiatry;  Laterality: Right;    ELBOW PROCEDURE Left     EYE SURGERY Bilateral     KNEE SURGERY Bilateral     ARTHROSCOPY    TONSILLECTOMY       History reviewed. No pertinent family history.  Social History     Socioeconomic History    Marital status:    Tobacco Use    Smoking status: Every Day     Current packs/day: 1.00     Average packs/day: 1 pack/day for 62.3 years (62.3 ttl pk-yrs)     Types: Cigarettes     Start date: 9/15/1962     Passive exposure: Current    Smokeless tobacco: Never    Tobacco comments:     REPORTS HAS BEEN ATTEMPTING TO QUIT, LAST 9/3/24 0700   Vaping Use    Vaping status: Never Used   Substance and Sexual Activity    Alcohol use: Not Currently    Drug use: Never    Sexual activity: Defer     No Known Allergies    Current Outpatient Medications:     albuterol sulfate  (90 Base) MCG/ACT inhaler, Inhale 2 puffs Every 4 (Four) Hours As Needed for Wheezing or Shortness of Air., Disp: , Rfl:     ALPHA LIPOIC ACID PO, Take 1 capsule by mouth Daily., Disp: , Rfl:     aspirin 81 MG EC tablet, Take 1 tablet by mouth., Disp: , Rfl:     atorvastatin (LIPITOR) 80 MG tablet, Take 1 tablet by mouth Every Night., Disp: , Rfl:     BD Pen Needle Nubia U/F 32G X 4 MM misc, , Disp: , Rfl:     buPROPion XL (WELLBUTRIN XL) 300 MG 24 hr tablet, Take 1 tablet by mouth Every Morning., Disp: , Rfl:     clopidogrel (PLAVIX) 75 MG tablet, Take 1 tablet by mouth Daily., Disp: , Rfl:     cyanocobalamin 1000 MCG/ML injection, 1 mL Every 28 (Twenty-Eight)  "Days., Disp: , Rfl:     ELDERBERRY PO, Take  by mouth., Disp: , Rfl:     Entresto  MG tablet, Take 1 tablet by mouth 2 (Two) Times a Day., Disp: , Rfl:     esomeprazole (nexIUM) 40 MG packet, Take 40 mg by mouth Every Morning Before Breakfast for 180 days., Disp: 90 each, Rfl: 1    finasteride (PROSCAR) 5 MG tablet, Take 1 tablet by mouth Daily., Disp: , Rfl:     FREESTYLE LITE test strip, , Disp: , Rfl:     Jardiance 25 MG tablet tablet, Take 1 tablet by mouth Daily for 180 days., Disp: 90 tablet, Rfl: 1    Lancets (freestyle) lancets, , Disp: , Rfl:     metoprolol succinate XL (TOPROL-XL) 100 MG 24 hr tablet, Take 1 tablet by mouth 2 (Two) Times a Day., Disp: , Rfl:     Ozempic, 1 MG/DOSE, 4 MG/3ML solution pen-injector, Inject 1 mg under the skin into the appropriate area as directed 1 (One) Time Per Week., Disp: 9 mL, Rfl: 1    tamsulosin (FLOMAX) 0.4 MG capsule 24 hr capsule, Take 2 capsules by mouth Daily., Disp: 180 capsule, Rfl: 3    Trelegy Ellipta 100-62.5-25 MCG/ACT inhaler, Inhale 1 puff Daily for 180 days., Disp: 60 each, Rfl: 2    Tresiba FlexTouch 100 UNIT/ML solution pen-injector injection, Inject 25 Units under the skin into the appropriate area as directed Daily for 180 days., Disp: 30 mL, Rfl: 1    Objective     Vitals:    12/19/24 1031   BP: 131/69   BP Location: Right arm   Patient Position: Sitting   Cuff Size: Adult   Pulse: 87   SpO2: 99%   Weight: 86.7 kg (191 lb 3.2 oz)   Height: 177.8 cm (70\")     Body mass index is 27.43 kg/m².    Physical Exam  Constitutional:       Appearance: Normal appearance.      Comments: Overweight (BMI 25 - 29.9) Pt Current BMI = 27.43     HENT:      Head: Normocephalic and atraumatic.      Right Ear: External ear normal.      Left Ear: External ear normal.      Nose: Nose normal.   Eyes:      Extraocular Movements: Extraocular movements intact.      Conjunctiva/sclera: Conjunctivae normal.   Pulmonary:      Effort: Pulmonary effort is normal. "   Musculoskeletal:         General: Normal range of motion.      Cervical back: Normal range of motion.   Skin:     General: Skin is warm and dry.   Neurological:      General: No focal deficit present.      Mental Status: He is alert and oriented to person, place, and time. Mental status is at baseline.   Psychiatric:         Mood and Affect: Mood normal.         Behavior: Behavior normal.         Thought Content: Thought content normal.         Judgment: Judgment normal.         Result Review :   The following data was reviewed by: JENNI Villegas on 12/19/2024:    Most Recent A1C          7/25/2024    16:58   HGBA1C Most Recent   Hemoglobin A1C 6.30        A1C Last 3 Results          7/25/2024    16:58   HGBA1C Last 3 Results   Hemoglobin A1C 6.30      A1c collected on 7/25/2024  is 6.3%, indicating Controlled Type II diabetes.      Glucose   Date Value Ref Range Status   09/03/2024 123 (H) 70 - 99 mg/dL Final     Comment:     Serial Number: [id number]:  [id number]   06/20/2022 136 (H) 74 - 100 mg/dL Final     Creatinine   Date Value Ref Range Status   07/25/2024 0.86 0.76 - 1.27 mg/dL Final     eGFR   Date Value Ref Range Status   07/25/2024 89.7 >60.0 mL/min/1.73 Final     Labs collected on 7/25/2024 show Stage II mild (GFR = 60-89mL/min)    Microalbumin, Urine   Date Value Ref Range Status   07/25/2024 <1.2 mg/dL Final     Creatinine, Urine   Date Value Ref Range Status   07/25/2024 87.4 mg/dL Final     Microalbumin/Creatinine Ratio   Date Value Ref Range Status   07/25/2024   Final     Comment:     Unable to calculate     Urine microalbuminuria collected on 7/25/2024 is negative for microalbuminuria    Total Cholesterol   Date Value Ref Range Status   07/25/2024 103 0 - 200 mg/dL Final     Triglycerides   Date Value Ref Range Status   07/25/2024 54 0 - 150 mg/dL Final     HDL Cholesterol   Date Value Ref Range Status   07/25/2024 48 40 - 60 mg/dL Final     LDL Cholesterol    Date Value Ref Range  Status   07/25/2024 42 0 - 100 mg/dL Final     Lipid panel collected on 7/25/2024 shows normal lipid panel            Assessment: Patient presents for 3-month follow-up.  Patient expresses an interest in utilizing a continuous glucose monitor.  Patient's glucose meter was reviewed showing fasting glucose numbers consistently between 90 and 150 mg/dL.  Meter did show a couple of hyperglycemic excursions into the 215 mg/dL range, but these were isolated events.  Patient states that his right third toe amputation site is healing well and follows up with podiatry tomorrow.  Patient's weight has remained stable with a current BMI of 27.43 kg/m².      Diagnoses and all orders for this visit:    1. Controlled type 2 diabetes mellitus with hyperglycemia, with long-term current use of insulin (Primary)  -     Microalbumin / Creatinine Urine Ratio - Urine, Clean Catch; Future  -     Hemoglobin A1c; Future    2. Controlled type 2 diabetes mellitus with stage 2 chronic kidney disease, with long-term current use of insulin    3. Controlled type 2 diabetes mellitus with diabetic polyneuropathy, with long-term current use of insulin    4. Hemoglobin A1c less than 7.0%        Plan: An order for FreeStyle Faustina 3+ sensor system sent to [facility], the preferred DME supplier of traditional Medicare.  No other changes were made to the patient's plan of care.  Patient will contact the office for assistance with the FreeStyle Faustina 3+ sensor if needed.  Patient is encouraged continue to focus on his diet and physical activity to assist with improvement in glycemic control and weight management.  Patient is scheduled for follow-up in 3 months, an A1c will be collected, and CGM report reviewed.    The patient will monitor his blood glucose levels per CGM.  If he develops problematic hyperglycemia or hypoglycemia or adverse drug reactions, he will contact the office for further instructions.        Follow Up     Return in about 3 months  (around 3/19/2025) for CGM Follow-Up, Medication Management.    Patient was given instructions and counseling regarding his condition or for health maintenance advice. Please see specific information pulled into the AVS if appropriate.     Klever Hubbard, APRN  12/19/2024      Dictated Utilizing Dragon Dictation.  Please note that portions of this note were completed with a voice recognition program.  Part of this note may be an electronic transcription/translation of spoken language to printed text using the Dragon Dictation System.

## 2024-12-21 ENCOUNTER — OFFICE VISIT (OUTPATIENT)
Dept: FAMILY MEDICINE CLINIC | Facility: CLINIC | Age: 79
End: 2024-12-21
Payer: MEDICARE

## 2024-12-21 VITALS
DIASTOLIC BLOOD PRESSURE: 70 MMHG | SYSTOLIC BLOOD PRESSURE: 150 MMHG | TEMPERATURE: 97.6 F | WEIGHT: 192.6 LBS | OXYGEN SATURATION: 100 % | HEART RATE: 87 BPM | BODY MASS INDEX: 27.64 KG/M2

## 2024-12-21 DIAGNOSIS — J44.1 COPD WITH EXACERBATION: Primary | ICD-10-CM

## 2024-12-21 DIAGNOSIS — J06.9 UPPER RESPIRATORY TRACT INFECTION, UNSPECIFIED TYPE: ICD-10-CM

## 2024-12-21 DIAGNOSIS — H10.31 ACUTE CONJUNCTIVITIS OF RIGHT EYE, UNSPECIFIED ACUTE CONJUNCTIVITIS TYPE: ICD-10-CM

## 2024-12-21 LAB
EXPIRATION DATE: NORMAL
FLUAV AG UPPER RESP QL IA.RAPID: NOT DETECTED
FLUBV AG UPPER RESP QL IA.RAPID: NOT DETECTED
INTERNAL CONTROL: NORMAL
Lab: NORMAL
SARS-COV-2 AG UPPER RESP QL IA.RAPID: NOT DETECTED

## 2024-12-21 PROCEDURE — 1125F AMNT PAIN NOTED PAIN PRSNT: CPT | Performed by: NURSE PRACTITIONER

## 2024-12-21 PROCEDURE — 87428 SARSCOV & INF VIR A&B AG IA: CPT | Performed by: NURSE PRACTITIONER

## 2024-12-21 PROCEDURE — 3077F SYST BP >= 140 MM HG: CPT | Performed by: NURSE PRACTITIONER

## 2024-12-21 PROCEDURE — 99214 OFFICE O/P EST MOD 30 MIN: CPT | Performed by: NURSE PRACTITIONER

## 2024-12-21 PROCEDURE — 3078F DIAST BP <80 MM HG: CPT | Performed by: NURSE PRACTITIONER

## 2024-12-21 PROCEDURE — 1160F RVW MEDS BY RX/DR IN RCRD: CPT | Performed by: NURSE PRACTITIONER

## 2024-12-21 PROCEDURE — 1159F MED LIST DOCD IN RCRD: CPT | Performed by: NURSE PRACTITIONER

## 2024-12-21 RX ORDER — DEXAMETHASONE 4 MG/1
4 TABLET ORAL
Qty: 4 TABLET | Refills: 0 | Status: SHIPPED | OUTPATIENT
Start: 2024-12-21

## 2024-12-21 RX ORDER — BENZONATATE 100 MG/1
100 CAPSULE ORAL 3 TIMES DAILY PRN
Qty: 30 CAPSULE | Refills: 0 | Status: SHIPPED | OUTPATIENT
Start: 2024-12-21

## 2024-12-21 RX ORDER — ERYTHROMYCIN 5 MG/G
OINTMENT OPHTHALMIC NIGHTLY
Qty: 3.5 G | Refills: 0 | Status: SHIPPED | OUTPATIENT
Start: 2024-12-21

## 2024-12-21 RX ORDER — DOXYCYCLINE 100 MG/1
100 CAPSULE ORAL 2 TIMES DAILY
Qty: 20 CAPSULE | Refills: 0 | Status: SHIPPED | OUTPATIENT
Start: 2024-12-21

## 2024-12-21 NOTE — PROGRESS NOTES
Chief Complaint  URI, Shortness of Breath, Wheezing, and Cough      History of Present Illness  Josemanuel De La Fuente is a 79 y.o. male who presents to [facility] with a past medical history of  Past Medical History:   Diagnosis Date    Chronic osteomyelitis     RIGHT FOOT    COPD (chronic obstructive pulmonary disease)     Coronary artery disease     REPORTS HAS APPT WITH DR SUGGS 10/2024. REPORTS HAD SEEN CARDIOLOGY [facility] IN [place] DR YURIY CONWAY DENIES CP/SOA. DECREASED ACTIVITY D/T RIGHT 3 RD TOE    Diabetes mellitus     Foot ulcer     GERD (gastroesophageal reflux disease)     Hyperlipidemia     Hypertension     Low back pain     PAD (peripheral artery disease)     DCB LEFT LEFT MID [facility]  AND TASHA 10/20/21    Pneumonia     Seizures     LAST SEIZURE WAS OVER 50-60 YEARS AGO       HPI     The patient is a 79-year-old male who presents for evaluation of an acute on chronic cough and conjunctivitis.    He has been experiencing a persistent cough for several years, which has recently worsened. The cough is predominantly dry, but there have been instances of productive coughing. He also reports increased wheezing and shortness of breath. He has not sought over-the-counter medication for these symptoms. His symptoms were exacerbated following a visit to his diabetes specialist, [facility] [place]. His daughter, a home health technician, has advised him to wear a mask and avoid handshakes due to the potential risk of viral transmission. He is on Trelegy once a day, but he has not been consistent with it.    He has a history of diabetes and sees Klever Hubbard at [facility] in [place]. His blood sugar levels have been well-controlled, with a reading of 129 this morning, which is slightly higher than his usual range. He attributes this slight elevation to dietary changes and inconsistent medication adherence. He reports no blood sugar readings in  the 200s for over a year.    He has an eye that looks a little bit red and has some drainage in it.    ALLERGIES  The patient has no known allergies.    MEDICATIONS  Trelegy    IMMUNIZATIONS  He received the RSV vaccine.       Objective   Vital Signs:   Vitals:    12/21/24 1121   BP: 150/70   Pulse: 87   Temp: 97.6 °F (36.4 °C)   SpO2: 100%   Weight: 87.4 kg (192 lb 9.6 oz)   PainSc:   6   PainLoc: Generalized     Body mass index is 27.64 kg/m².    Wt Readings from Last 3 Encounters:   12/21/24 87.4 kg (192 lb 9.6 oz)   12/19/24 86.7 kg (191 lb 3.2 oz)   11/14/24 85.3 kg (188 lb)     BP Readings from Last 3 Encounters:   12/21/24 150/70   12/19/24 131/69   11/14/24 129/76       Health Maintenance   Topic Date Due    DIABETIC EYE EXAM  Never done    ZOSTER VACCINE (1 of 2) Never done    RSV Vaccine - Adults (1 - 1-dose 75+ series) Never done    COVID-19 Vaccine (5 - 2024-25 season) 09/01/2024    HEMOGLOBIN A1C  01/25/2025    ANNUAL WELLNESS VISIT  07/25/2025    LIPID PANEL  07/25/2025    LUNG CANCER SCREENING  08/05/2025    BMI FOLLOWUP  09/27/2025    TDAP/TD VACCINES (2 - Td or Tdap) 10/15/2034    HEPATITIS C SCREENING  Completed    INFLUENZA VACCINE  Completed    Pneumococcal Vaccine 65+  Completed    URINE MICROALBUMIN  Discontinued       Physical Exam  Vitals reviewed.   Constitutional:       General: He is not in acute distress.     Appearance: Normal appearance. He is well-developed.   HENT:      Head: Normocephalic and atraumatic.      Right Ear: External ear normal.      Left Ear: External ear normal.      Nose:      Comments: Left nostril with bleeding noted  Eyes:      Conjunctiva/sclera: Conjunctivae normal.      Pupils: Pupils are equal, round, and reactive to light.   Cardiovascular:      Rate and Rhythm: Normal rate and regular rhythm.      Heart sounds: Normal heart sounds.   Pulmonary:      Effort: Pulmonary effort is normal.      Breath sounds: Normal breath sounds.      Comments: Deep wet  cough  Musculoskeletal:      Cervical back: Neck supple.      Right lower leg: No edema.      Left lower leg: No edema.      Comments: Using cane for ambulation   Skin:     General: Skin is warm and dry.   Neurological:      Mental Status: He is alert and oriented to person, place, and time.   Psychiatric:         Mood and Affect: Mood and affect normal.         Behavior: Behavior normal.         Thought Content: Thought content normal.         Judgment: Judgment normal.            Result Review :  The following data was reviewed by: JENNI Hernandez on 12/21/2024:  Results  Laboratory Studies  Blood sugar was 129. COVID-19 and influenza tests were negative.     SARS Antigen   Date Value Ref Range Status   12/21/2024 Not Detected Not Detected, Presumptive Negative Final     Influenza A Antigen LORNA   Date Value Ref Range Status   12/21/2024 Not Detected Not Detected Final     Influenza B Antigen LORNA   Date Value Ref Range Status   12/21/2024 Not Detected Not Detected Final     Internal Control   Date Value Ref Range Status   12/21/2024 Passed Passed Final     Lot Number   Date Value Ref Range Status   12/21/2024 709,772  Final     Expiration Date   Date Value Ref Range Status   12/21/2024 7,112,[id number]  Final       Procedures        Assessment and Plan   Diagnoses and all orders for this visit:    1. COPD with exacerbation (Primary)  -     dexAMETHasone (DECADRON) 4 MG tablet; Take 1 tablet by mouth Daily With Breakfast.  Dispense: 4 tablet; Refill: 0  -     doxycycline (VIBRAMYCIN) 100 MG capsule; Take 1 capsule by mouth 2 (Two) Times a Day.  Dispense: 20 capsule; Refill: 0  -     benzonatate (Tessalon Perles) 100 MG capsule; Take 1 capsule by mouth 3 (Three) Times a Day As Needed for Cough.  Dispense: 30 capsule; Refill: 0    2. Upper respiratory tract infection, unspecified type  -     POCT SARS-CoV-2 + Flu Antigen LORNA    3. Acute conjunctivitis of right eye, unspecified acute conjunctivitis type  -      erythromycin (ROMYCIN) 5 MG/GM ophthalmic ointment; Administer  to the right eye Every Night.  Dispense: 3.5 g; Refill: 0         1. Chronic cough.  Given his history of COPD and diabetes, an antibiotic regimen is deemed necessary to prevent the progression of his condition into pneumonia. Despite clear lung sounds, the presence of a wet cough necessitates this approach. His blood pressure is slightly elevated today. A prescription for doxycycline, to be taken twice daily for 10 days, will be provided. Over-the-counter Mucinex tablets or capsules are recommended, as the syrup form could potentially elevate his blood sugar levels. Benzonatate (Tessalon Perles) will be prescribed for cough management, as it does not impact blood pressure or blood sugar levels. [facility] He is advised to maintain adequate hydration and commence the prescribed medication regimen. Until he feels better, he can keep wearing the mask and try to keep his distance a bit and clean surfaces.    2. Diabetes mellitus.  His blood sugar levels have been running slightly higher than usual, but he has not had any readings in the 200s in over a year. He is advised to continue monitoring his blood sugar levels and adhere to his current medication regimen.    3. Conjunctivitis.  Erythromycin ointment will be prescribed for application in the affected eye to treat conjunctivitis.               FOLLOW UP  Return if symptoms worsen or fail to improve.    Patient was given instructions and counseling regarding his condition or for health maintenance advice. Please see specific information pulled into the AVS if appropriate.       Nataliia Parker, JENNI  12/21/24  12:05 EST    CURRENT & DISCONTINUED MEDICATIONS  Current Outpatient Medications   Medication Instructions    albuterol sulfate  (90 Base) MCG/ACT inhaler Inhale 2 puffs Every 4 (Four) Hours As Needed for Wheezing or Shortness of Air.    ALPHA LIPOIC ACID  PO 1 capsule, Daily    aspirin 81 mg    atorvastatin (LIPITOR) 80 MG tablet Take 1 tablet by mouth Every Night.    BD Pen Needle Nubia U/F 32G X 4 MM misc     benzonatate (TESSALON PERLES) 100 mg, Oral, 3 Times Daily PRN    buPROPion XL (WELLBUTRIN XL) 300 MG 24 hr tablet 1 tablet, Every Morning    clopidogrel (PLAVIX) 75 MG tablet Take 1 tablet by mouth Daily.    cyanocobalamin 1000 MCG/ML injection 1 mL Every 28 (Twenty-Eight) Days.    dexAMETHasone (DECADRON) 4 mg, Oral, Daily With Breakfast    doxycycline (VIBRAMYCIN) 100 mg, Oral, 2 Times Daily    ELDERBERRY PO Take  by mouth.    Entresto  MG tablet Take 1 tablet by mouth 2 (Two) Times a Day.    erythromycin (ROMYCIN) 5 MG/GM ophthalmic ointment Right Eye, Nightly    esomeprazole (NEXIUM) 40 mg, Oral, Every Morning Before Breakfast    finasteride (PROSCAR) 5 MG tablet Take 1 tablet by mouth Daily.    FREESTYLE LITE test strip     Jardiance 25 mg, Oral, Daily    Lancets (freestyle) lancets     metoprolol succinate XL (TOPROL-XL) 100 MG 24 hr tablet Take 1 tablet by mouth 2 (Two) Times a Day.    Ozempic (1 MG/DOSE) 1 mg, Subcutaneous, Weekly    tamsulosin (FLOMAX) 0.8 mg, Oral, Daily    Trelegy Ellipta 100-62.5-25 MCG/ACT inhaler 1 puff, Inhalation, Daily - RT    Tresiba FlexTouch 25 Units, Subcutaneous, Daily       There are no discontinued medications.     Patient or patient representative verbalized consent for the use of Ambient Listening during the visit with  JENNI Hernandez for chart documentation. 12/21/2024  11:49 EST

## 2025-01-28 ENCOUNTER — OFFICE VISIT (OUTPATIENT)
Dept: FAMILY MEDICINE CLINIC | Facility: CLINIC | Age: 80
End: 2025-01-28
Payer: MEDICARE

## 2025-01-28 VITALS
SYSTOLIC BLOOD PRESSURE: 120 MMHG | BODY MASS INDEX: 27.36 KG/M2 | DIASTOLIC BLOOD PRESSURE: 69 MMHG | OXYGEN SATURATION: 99 % | HEART RATE: 85 BPM | WEIGHT: 190.7 LBS | TEMPERATURE: 97.7 F

## 2025-01-28 DIAGNOSIS — S90.512A ABRASION OF LEFT ANKLE, INITIAL ENCOUNTER: Primary | ICD-10-CM
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
## 2025-01-28 PROCEDURE — 99213 OFFICE O/P EST LOW 20 MIN: CPT | Performed by: FAMILY MEDICINE

## 2025-01-28 PROCEDURE — 1160F RVW MEDS BY RX/DR IN RCRD: CPT | Performed by: FAMILY MEDICINE

## 2025-01-28 PROCEDURE — 3074F SYST BP LT 130 MM HG: CPT | Performed by: FAMILY MEDICINE

## 2025-01-28 PROCEDURE — 3078F DIAST BP <80 MM HG: CPT | Performed by: FAMILY MEDICINE

## 2025-01-28 PROCEDURE — 1159F MED LIST DOCD IN RCRD: CPT | Performed by: FAMILY MEDICINE

## 2025-01-28 PROCEDURE — 1125F AMNT PAIN NOTED PAIN PRSNT: CPT | Performed by: FAMILY MEDICINE

## 2025-01-28 NOTE — PROGRESS NOTES
Chief Complaint  Foot Injury (Left ankle foot injury from fall. Pt having trouble feeling since he has neuropathy. Appointment to see a podiatrist next Monday for toenails )    Subjective      Josemanuel De La Fuente is a 79 y.o. male who presents to [facility]     Left ankle and foot injury from fall 2 days ago - tripped going up some steps on the side of his house.  He has trouble with sensation in his feet due to neuropathy.  He has an appointment next Monday with a podiatrist for his toenails. Left ankle was swollen and red. Has a scrape on the medial left ankle.    He is also working on getting in with a new neurologist.  Had a poor experience with his previous neurologist's office.    He is working on getting in with an eye doctor as well.    Objective   Vital Signs:   Vitals:    01/28/25 1108   BP: 120/69   Pulse: 85   Temp: 97.7 °F (36.5 °C)   SpO2: 99%   Weight: 88 kg (194 lb)     Body mass index is 27.84 kg/m².    Wt Readings from Last 3 Encounters:   01/28/25 88 kg (194 lb)   12/21/24 87.4 kg (192 lb 9.6 oz)   12/19/24 86.7 kg (191 lb 3.2 oz)     BP Readings from Last 3 Encounters:   01/28/25 120/69   12/21/24 150/70   12/19/24 131/69       Health Maintenance   Topic Date Due    DIABETIC EYE EXAM  Never done    ZOSTER VACCINE (1 of 2) Never done    RSV Vaccine - Adults (1 - 1-dose 75+ series) Never done    COVID-19 Vaccine (5 - 2024-25 season) 09/01/2024    HEMOGLOBIN A1C  01/25/2025    ANNUAL WELLNESS VISIT  07/25/2025    LIPID PANEL  07/25/2025    LUNG CANCER SCREENING  08/05/2025    BMI FOLLOWUP  09/27/2025    TDAP/TD VACCINES (2 - Td or Tdap) 10/15/2034    HEPATITIS C SCREENING  Completed    INFLUENZA VACCINE  Completed    Pneumococcal Vaccine 65+  Completed    URINE MICROALBUMIN  Discontinued       Physical Exam  Vitals and nursing note reviewed.   Constitutional:       General: He is not in acute distress.  Cardiovascular:      Rate and Rhythm: Normal rate and regular  rhythm.      Heart sounds: Normal heart sounds.   Pulmonary:      Effort: Pulmonary effort is normal. No respiratory distress.      Breath sounds: Normal breath sounds.   Skin:     Comments: Abrasion/scab on left medial ankle, pictured below. Minimal surrounding redness, no tenderness.    Neurological:      Mental Status: He is alert.   Psychiatric:         Mood and Affect: Mood normal.         Behavior: Behavior normal.          Result Review :  The following data was reviewed by: Tino Roach MD on 01/28/2025:         Procedures          Assessment & Plan  Abrasion of left ankle, initial encounter  Abrasion is slightly swollen but no pus, minimal redness and warmth. Does not look to be acutely infected. Will avoid PO antibiotics but I recommended topical triple abx or bacitracin on the wound. Let us know immediately or follow up if any worsening or new symptoms such as fever/chills. Triple antibiotic and nonadherant pad applied in clinic today.       Fall on stairs, initial encounter                        FOLLOW UP  Return if symptoms worsen or fail to improve.  Patient was given instructions and counseling regarding his condition or for health maintenance advice. Please see specific information pulled into the AVS if appropriate.       Tino Roach MD  01/28/25  11:24 EST    CURRENT & DISCONTINUED MEDICATIONS  Current Outpatient Medications   Medication Instructions    albuterol sulfate  (90 Base) MCG/ACT inhaler Inhale 2 puffs Every 4 (Four) Hours As Needed for Wheezing or Shortness of Air.    ALPHA LIPOIC ACID PO 1 capsule, Daily    aspirin 81 mg    atorvastatin (LIPITOR) 80 MG tablet Take 1 tablet by mouth Every Night.    BD Pen Needle Nubia U/F 32G X 4 MM misc     benzonatate (TESSALON PERLES) 100 mg, Oral, 3 Times Daily PRN    buPROPion XL (WELLBUTRIN XL) 300 MG 24 hr tablet 1 tablet, Every Morning    clopidogrel (PLAVIX) 75 MG tablet Take 1 tablet by mouth Daily.    cyanocobalamin 1000  MCG/ML injection 1 mL Every 28 (Twenty-Eight) Days.    dexAMETHasone (DECADRON) 4 mg, Oral, Daily With Breakfast    ELDERBERRY PO Take  by mouth.    Entresto  MG tablet Take 1 tablet by mouth 2 (Two) Times a Day.    erythromycin (ROMYCIN) 5 MG/GM ophthalmic ointment Right Eye, Nightly    esomeprazole (NEXIUM) 40 mg, Oral, Every Morning Before Breakfast    finasteride (PROSCAR) 5 MG tablet Take 1 tablet by mouth Daily.    FREESTYLE LITE test strip     Jardiance 25 mg, Oral, Daily    Lancets (freestyle) lancets     metoprolol succinate XL (TOPROL-XL) 100 MG 24 hr tablet Take 1 tablet by mouth 2 (Two) Times a Day.    Ozempic (1 MG/DOSE) 1 mg, Subcutaneous, Weekly    tamsulosin (FLOMAX) 0.8 mg, Oral, Daily    Trelegy Ellipta 100-62.5-25 MCG/ACT inhaler 1 puff, Inhalation, Daily - RT    Tresiba FlexTouch 25 Units, Subcutaneous, Daily       Medications Discontinued During This Encounter   Medication Reason    doxycycline (VIBRAMYCIN) 100 MG capsule

## 2025-02-03 ENCOUNTER — OFFICE VISIT (OUTPATIENT)
Dept: PODIATRY | Facility: CLINIC | Age: 80
End: 2025-02-03
Payer: MEDICARE

## 2025-02-03 VITALS
DIASTOLIC BLOOD PRESSURE: 80 MMHG | SYSTOLIC BLOOD PRESSURE: 142 MMHG | WEIGHT: 190 LBS | BODY MASS INDEX: 27.2 KG/M2 | HEART RATE: 105 BPM | HEIGHT: 70 IN | OXYGEN SATURATION: 94 % | TEMPERATURE: 98 F

## 2025-02-03 DIAGNOSIS — L60.0 INGROWN TOENAIL: ICD-10-CM

## 2025-02-03 DIAGNOSIS — L97.921 SKIN ULCER OF LEFT LOWER LEG, LIMITED TO BREAKDOWN OF SKIN: ICD-10-CM

## 2025-02-03 DIAGNOSIS — E08.59 DIABETES MELLITUS DUE TO UNDERLYING CONDITION WITH OTHER CIRCULATORY COMPLICATIONS: Primary | ICD-10-CM

## 2025-02-03 DIAGNOSIS — B35.1 ONYCHOMYCOSIS: ICD-10-CM

## 2025-02-04 RX ORDER — CEPHALEXIN 500 MG/1
500 CAPSULE ORAL 3 TIMES DAILY
Qty: 30 CAPSULE | Refills: 0 | Status: SHIPPED | OUTPATIENT
Start: 2025-02-04 | End: 2025-02-14

## 2025-02-04 NOTE — PROGRESS NOTES
[facility] - PODIATRY    Today's Date: 02/04/25    Patient Name: Josemanuel De La Fuente  MRN: [id number]  CSN: [id number]  PCP: Tino Roach MD, 1/25/2025  Referring Provider: No ref. provider found    SUBJECTIVE     Chief Complaint   Patient presents with    Right Foot - Follow-up, Nail Problem, Diabetes     Fall     Left Foot - Follow-up, Nail Problem, Diabetes     Fall a couple of days ago rash on ankle Saw PCP given cream   c/o pain under the big toe x 1 month      HPI: Josemanuel De La Fuente, a 79 y.o.male, presents to clinic.    Patient is here for follow-up.  Patient is doing well.  States something fell on his left leg and it caused a sore.  He also has painful elongated toenails that are difficult to him for him to cut due to his neuropathy and diabetes.    Past Medical History:   Diagnosis Date    Chronic osteomyelitis     RIGHT FOOT    COPD (chronic obstructive pulmonary disease)     Coronary artery disease     REPORTS HAS APPT WITH DR SUGGS 10/2024. REPORTS HAD SEEN CARDIOLOGY [facility] IN [place] DR YURIY CONWAY DENIES CP/SOA. DECREASED ACTIVITY D/T RIGHT 3 RD TOE    Diabetes mellitus     Foot ulcer     GERD (gastroesophageal reflux disease)     Hyperlipidemia     Hypertension     Low back pain     PAD (peripheral artery disease)     DCB LEFT LEFT MID [facility]  AND [facility] 10/20/21    Pneumonia     Seizures     LAST SEIZURE WAS OVER 50-60 YEARS AGO     Past Surgical History:   Procedure Laterality Date    AMPUTATION DIGIT Right 9/3/2024    Procedure: AMPUTATION DIGIT RIGHT 3RD TOE;  Surgeon: Alton Fox DPM;  Location: [facility];  Service: Podiatry;  Laterality: Right;    ELBOW PROCEDURE Left     EYE SURGERY Bilateral     KNEE SURGERY Bilateral     ARTHROSCOPY    TONSILLECTOMY       History reviewed. No pertinent family history.  Social History     Socioeconomic History    Marital status:    Tobacco Use    Smoking status: Every Day     Current packs/day: 1.00     Average  packs/day: 1 pack/day for 62.4 years (62.4 ttl pk-yrs)     Types: Cigarettes     Start date: 9/15/1962     Passive exposure: Current    Smokeless tobacco: Never   Vaping Use    Vaping status: Never Used   Substance and Sexual Activity    Alcohol use: Not Currently    Drug use: Never    Sexual activity: Defer     No Known Allergies  Current Outpatient Medications   Medication Sig Dispense Refill    albuterol sulfate  (90 Base) MCG/ACT inhaler Inhale 2 puffs Every 4 (Four) Hours As Needed for Wheezing or Shortness of Air.      ALPHA LIPOIC ACID PO Take 1 capsule by mouth Daily.      aspirin 81 MG EC tablet Take 1 tablet by mouth.      atorvastatin (LIPITOR) 80 MG tablet Take 1 tablet by mouth Every Night.      BD Pen Needle Nubia U/F 32G X 4 MM misc       benzonatate (Tessalon Perles) 100 MG capsule Take 1 capsule by mouth 3 (Three) Times a Day As Needed for Cough. 30 capsule 0    buPROPion XL (WELLBUTRIN XL) 300 MG 24 hr tablet Take 1 tablet by mouth Every Morning.      clopidogrel (PLAVIX) 75 MG tablet Take 1 tablet by mouth Daily.      cyanocobalamin 1000 MCG/ML injection 1 mL Every 28 (Twenty-Eight) Days.      dexAMETHasone (DECADRON) 4 MG tablet Take 1 tablet by mouth Daily With Breakfast. 4 tablet 0    ELDERBERRY PO Take  by mouth.      Entresto  MG tablet Take 1 tablet by mouth 2 (Two) Times a Day.      erythromycin (ROMYCIN) 5 MG/GM ophthalmic ointment Administer  to the right eye Every Night. 3.5 g 0    esomeprazole (nexIUM) 40 MG packet Take 40 mg by mouth Every Morning Before Breakfast for 180 days. 90 each 1    finasteride (PROSCAR) 5 MG tablet Take 1 tablet by mouth Daily.      FREESTYLE LITE test strip       Jardiance 25 MG tablet tablet Take 1 tablet by mouth Daily for 180 days. 90 tablet 1    Lancets (freestyle) lancets       metoprolol succinate XL (TOPROL-XL) 100 MG 24 hr tablet Take 1 tablet by mouth 2 (Two) Times a Day.      Ozempic, 1 MG/DOSE, 4 MG/3ML solution pen-injector Inject 1 mg  under the skin into the appropriate area as directed 1 (One) Time Per Week. 9 mL 1    tamsulosin (FLOMAX) 0.4 MG capsule 24 hr capsule Take 2 capsules by mouth Daily. 180 capsule 3    Trelegy Ellipta 100-62.5-25 MCG/ACT inhaler Inhale 1 puff Daily for 180 days. 60 each 2    Tresiba FlexTouch 100 UNIT/ML solution pen-injector injection Inject 25 Units under the skin into the appropriate area as directed Daily for 180 days. 30 mL 1     No current facility-administered medications for this visit.     Review of Systems   Constitutional: Negative.    Skin:         Painful toenails   Neurological:  Positive for numbness.   All other systems reviewed and are negative.      OBJECTIVE     Vitals:    02/03/25 1505   BP: 142/80   Pulse: 105   Temp: 98 °F (36.7 °C)   SpO2: 94%       WBC   Date Value Ref Range Status   07/25/2024 10.39 3.40 - 10.80 10*3/mm3 Final   08/06/2020 8.60 3.40 - 10.80 10*3/µL Final     RBC   Date Value Ref Range Status   07/25/2024 4.78 4.14 - 5.80 10*6/mm3 Final   08/06/2020 4.63 4.30 - 6.10 10*6/µL Final     Hemoglobin   Date Value Ref Range Status   07/25/2024 16.0 13.0 - 17.7 g/dL Final   08/06/2020 15.5 14.0 - 18.0 g/dL Final     Hematocrit   Date Value Ref Range Status   07/25/2024 47.8 37.5 - 51.0 % Final   08/06/2020 45.2 38.0 - 49.0 % Final     MCV   Date Value Ref Range Status   07/25/2024 100.0 (H) 79.0 - 97.0 fL Final   08/06/2020 97.6 (H) 80.0 - 96.0 fL Final     MCH   Date Value Ref Range Status   07/25/2024 33.5 (H) 26.6 - 33.0 pg Final   08/06/2020 33.4 26.0 - 34.0 pg Final     MCHC   Date Value Ref Range Status   07/25/2024 33.5 31.5 - 35.7 g/dL Final   08/06/2020 34.2 32.0 - 36.0 g/dL Final     RDW   Date Value Ref Range Status   07/25/2024 13.2 12.3 - 15.4 % Final   08/06/2020 13.9 11.5 - 15.5 % Final     RDW-SD   Date Value Ref Range Status   07/25/2024 48.1 37.0 - 54.0 fl Final     MPV   Date Value Ref Range Status   07/25/2024 10.9 6.0 - 12.0 fL Final   08/06/2020 9.3 6.0 - 9.5 fL  Final     Platelets   Date Value Ref Range Status   07/25/2024 150 140 - 450 10*3/mm3 Final   08/06/2020 127 (L) 150 - 440 10*3/µL Final     Neutrophil %   Date Value Ref Range Status   07/25/2024 74.6 42.7 - 76.0 % Final     Lymphocyte %   Date Value Ref Range Status   07/25/2024 15.0 (L) 19.6 - 45.3 % Final     Monocyte %   Date Value Ref Range Status   07/25/2024 8.1 5.0 - 12.0 % Final     Eosinophil %   Date Value Ref Range Status   07/25/2024 1.4 0.3 - 6.2 % Final     Basophil %   Date Value Ref Range Status   07/25/2024 0.5 0.0 - 1.5 % Final     Immature Grans %   Date Value Ref Range Status   07/25/2024 0.4 0.0 - 0.5 % Final     Neutrophils, Absolute   Date Value Ref Range Status   07/25/2024 7.75 (H) 1.70 - 7.00 10*3/mm3 Final     Lymphocytes, Absolute   Date Value Ref Range Status   07/25/2024 1.56 0.70 - 3.10 10*3/mm3 Final     Monocytes, Absolute   Date Value Ref Range Status   07/25/2024 0.84 0.10 - 0.90 10*3/mm3 Final     Eosinophils, Absolute   Date Value Ref Range Status   07/25/2024 0.15 0.00 - 0.40 10*3/mm3 Final     Basophils, Absolute   Date Value Ref Range Status   07/25/2024 0.05 0.00 - 0.20 10*3/mm3 Final     Immature Grans, Absolute   Date Value Ref Range Status   07/25/2024 0.04 0.00 - 0.05 10*3/mm3 Final     nRBC   Date Value Ref Range Status   07/25/2024 0.0 0.0 - 0.2 /100 WBC Final         Lab Results   Component Value Date    GLUCOSE 121 (H) 07/25/2024    BUN 13 07/25/2024    CREATININE 0.86 07/25/2024    BCR 15.1 07/25/2024    K 4.2 07/25/2024    CO2 23.6 07/25/2024    CALCIUM 8.5 (L) 07/25/2024    ALBUMIN 4.0 07/25/2024    AST 25 07/25/2024    ALT 27 07/25/2024       Patient seen in no apparent distress.      PHYSICAL EXAM:     Foot/Ankle Exam    GENERAL  Appearance:  appears stated age  Orientation:  AAOx3  Affect:  appropriate  Gait:  unimpaired  Assistance:  independent  Right shoe gear: casual shoe  Left shoe gear: casual shoe    VASCULAR     Right Foot Vascularity   Normal vascular  exam    Dorsalis pedis:  2+  Posterior tibial:  2+  Skin temperature:  warm  Edema grading:  None  CFT:  < 3 seconds  Pedal hair growth:  Present  Varicosities:  none     Left Foot Vascularity   Normal vascular exam    Dorsalis pedis:  2+  Posterior tibial:  2+  Skin temperature:  warm  Edema grading:  None  CFT:  < 3 seconds  Pedal hair growth:  Present  Varicosities:  none     NEUROLOGIC     Right Foot Neurologic   Normal sensation    Light touch sensation: normal  Vibratory sensation: normal  Hot/Cold sensation: normal  Protective Sensation using Mullin-Marshal Monofilament:   Sites intact: 10  Sites tested: 10     Left Foot Neurologic   Normal sensation    Light touch sensation: normal  Vibratory sensation: normal  Hot/Cold sensation:  normal  Protective Sensation using Mullin-Marshal Monofilament:   Sites intact: 10  Sites tested: 10    MUSCLE STRENGTH     Right Foot Muscle Strength   Foot dorsiflexion:  4  Foot plantar flexion:  4  Foot inversion:  4  Foot eversion:  4     Left Foot Muscle Strength   Foot dorsiflexion:  4  Foot plantar flexion:  4  Foot inversion:  4  Foot eversion:  4    RANGE OF MOTION     Right Foot Range of Motion   Foot and ankle ROM within normal limits       Left Foot Range of Motion   Foot and ankle ROM within normal limits      DERMATOLOGIC      Right Foot Dermatologic   Skin  Right foot skin is intact.   Nails  1.  Positive for elongated, onychomycosis, abnormal thickness, subungual debris and ingrown toenail.  2.  Positive for elongated, onychomycosis, abnormal thickness, subungual debris and ingrown toenail.  3.  Positive for elongated, onychomycosis, abnormal thickness, subungual debris and ingrown toenail.  4.  Positive for elongated, onychomycosis, abnormal thickness, subungual debris and ingrown toenail.  5.  Positive for elongated, onychomycosis, abnormal thickness, subungual debris and ingrown toenail.  Nails comment:  Toenails 1, 2, 3, 4, and 5     Left Foot Dermatologic    Skin  Left foot skin is intact.   Nails comment:  Toenails 1, 2, 3, 4, and 5  Nails  1.  Positive for elongated, onychomycosis, abnormal thickness, subungual debris and ingrown toenail.  2.  Positive for elongated, onychomycosis, abnormal thickness, subungual debris and ingrown toenail.  3.  Positive for elongated, onychomycosis, abnormal thickness, subungual debris and ingrown toenail.  4.  Positive for elongated, onychomycosis, abnormally thick, subungual debris and ingrown toenail.  5.  Positive for elongated, onychomycosis, abnormally thick, subungual debris and ingrown toenail.     Left foot additional comments: Wound to medial aspect of left leg with mild periwound erythema.  No malodor no drainage no warmth.  Wound is granular with a scab present.      RADIOLOGY:        No results found.    ASSESSMENT/PLAN     Diagnoses and all orders for this visit:    1. 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 Onychomycosis    3. Ingrown toenail    4. Skin ulcer of left lower leg, limited to breakdown of skin        Comprehensive lower extremity examination and evaluation was performed.    Triple antibiotic and border dressing daily to left leg.  Compression wrap as needed to left leg to help with swelling.  Any worsening of symptoms patient to call the office or go to the emergency department.    Toenails 1, 2, 3, 4, 5 on Right and 1, 2, 3, 4, 5 on Left were debrided with nail nippers then filed with a Dremel nail leandra.  Patient tolerated procedure well without complications.    Patient return to clinic in 3 to 4 weeks for wound check.    Discussed findings and treatment plan including risks, benefits, and treatment options with patient in detail. Patient agreed with treatment plan.    Medications and allergies reviewed.  Reviewed available lab values along with other pertinent labs.  These were discussed with the patient.    An After Visit Summary was printed  and given to the patient at discharge, including (if requested) any available informative/educational handouts regarding diagnosis, treatment, or medications. All questions were answered to patient/family satisfaction. Should symptoms fail to improve or worsen they agree to call or return to clinic or to go to the Emergency Department. Discussed the importance of following up with any needed screening tests/labs/specialist appointments and any requested follow-up recommended by me today. Importance of maintaining follow-up discussed and patient accepts that missed appointments can delay diagnosis and potentially lead to worsening of conditions.    Return in about 3 months (around 5/3/2025)., or sooner if acute issues arise.    This document has been electronically signed by Alton Fox DPM on February 4, 2025 09:19 EST

## 2025-02-06 ENCOUNTER — LAB (OUTPATIENT)
Dept: LAB | Facility: HOSPITAL | Age: 80
End: 2025-02-06
Payer: MEDICARE

## 2025-02-06 DIAGNOSIS — E11.65 CONTROLLED TYPE 2 DIABETES MELLITUS WITH HYPERGLYCEMIA, WITH LONG-TERM CURRENT USE OF INSULIN: ICD-10-CM

## 2025-02-06 DIAGNOSIS — Z79.4 CONTROLLED TYPE 2 DIABETES MELLITUS WITH HYPERGLYCEMIA, WITH LONG-TERM CURRENT USE OF INSULIN: ICD-10-CM

## 2025-02-06 LAB
ALBUMIN UR-MCNC: 1.8 MG/DL
CREAT UR-MCNC: 67.7 MG/DL
HBA1C MFR BLD: 6.2 % (ref 4.8–5.6)
MICROALBUMIN/CREAT UR: 26.6 MG/G (ref 0–29)

## 2025-02-06 PROCEDURE — 36415 COLL VENOUS BLD VENIPUNCTURE: CPT

## 2025-02-06 PROCEDURE — 82043 UR ALBUMIN QUANTITATIVE: CPT

## 2025-02-06 PROCEDURE — 83036 HEMOGLOBIN GLYCOSYLATED A1C: CPT

## 2025-02-06 PROCEDURE — 82570 ASSAY OF URINE CREATININE: CPT

## 2025-02-11 RX ORDER — CEPHALEXIN 500 MG/1
500 CAPSULE ORAL 3 TIMES DAILY
Qty: 30 CAPSULE | Refills: 0 | Status: SHIPPED | OUTPATIENT
Start: 2025-02-11 | End: 2025-02-21

## 2025-02-11 RX ORDER — CEPHALEXIN 500 MG/1
CAPSULE ORAL
Qty: 30 CAPSULE | Refills: 35 | OUTPATIENT
Start: 2025-02-11

## 2025-02-13 ENCOUNTER — OFFICE VISIT (OUTPATIENT)
Dept: UROLOGY | Age: 80
End: 2025-02-13
Payer: MEDICARE

## 2025-02-13 VITALS — HEIGHT: 70 IN | WEIGHT: 187 LBS | BODY MASS INDEX: 26.77 KG/M2

## 2025-02-13 DIAGNOSIS — N40.0 BENIGN PROSTATIC HYPERPLASIA WITHOUT LOWER URINARY TRACT SYMPTOMS: Primary | ICD-10-CM

## 2025-02-13 LAB
BILIRUB BLD-MCNC: NEGATIVE MG/DL
CLARITY, POC: CLEAR
COLOR UR: YELLOW
EXPIRATION DATE: ABNORMAL
GLUCOSE UR STRIP-MCNC: ABNORMAL MG/DL
KETONES UR QL: NEGATIVE
LEUKOCYTE EST, POC: NEGATIVE
Lab: ABNORMAL
NITRITE UR-MCNC: NEGATIVE MG/ML
PH UR: 5.5 [PH] (ref 5–8)
PROT UR STRIP-MCNC: NEGATIVE MG/DL
RBC # UR STRIP: NEGATIVE /UL
SP GR UR: 1.02 (ref 1–1.03)
URINE VOLUME: NORMAL
URINE VOLUME: NORMAL
UROBILINOGEN UR QL: ABNORMAL

## 2025-02-13 NOTE — PROGRESS NOTES
"Chief Complaint: Benign Prostatic Hypertrophy (Patient denies any urinary issues at this moment. He had questions about the aquablation )    Subjective         Benign Prostatic Hypertrophy      Josemanuel De La Fuente is a 79 y.o. male presents to [facility] to be seen for f/u BPH.    At last visit we increased tamsuloisn dose and discussed procedures we also d/c oxybutynin.    He has remained on finasteride.    Nocturia x 1    Still with urgency.    Stream is weak.    Frequency with urination as well.    We recommended cutting down on caffeine intake he has not cut down on this.    PVR is 70 today.    He has had issues with balance recently some \"vertigo\"  states this is with laying down.      Previous:     The patient was seeing a urology group in [place] before.     He was seen in 2018 for sepsis and cellulitis in the groin with an I &D of the scrotum performed and subsequent nonhealing wound requiring hyperbaric oxygen therapy and wound care.    He was on tamsulosin 0.4 mg q day and finasteride 5mg  has been on this for several years.     He was on tamsuloisn 0.8 mg q day but he decreased this back down to 0.4     He is also noted to be on oxybutynin for OAB symptoms as well.    Reports issues with memory he also reports that he has issues with dry mouth.    His Pvr is 145 in office today.    Stream is weak.    He states issues with urgency.     Frequency with urination as well.    Nocturia x 1    He drinks a lot of caffeine 2 pots of coffee a day.    Last a 1 c was 6.3 3 mos ago.    No family hx of  malignancies.    He is a smoker smokes over 1 ppd x 60 years      Objective     Past Medical History:   Diagnosis Date    Chronic osteomyelitis     RIGHT FOOT    COPD (chronic obstructive pulmonary disease)     Coronary artery disease     REPORTS HAS APPT WITH DR SUGGS 10/2024. REPORTS HAD SEEN CARDIOLOGY [facility] IN [place] DR YURIY CONWAY DENIES CP/SOA. DECREASED ACTIVITY D/T RIGHT 3 RD TOE    " Diabetes mellitus     Foot ulcer     GERD (gastroesophageal reflux disease)     Hyperlipidemia     Hypertension     Low back pain     PAD (peripheral artery disease)     DCB LEFT LEFT MID SFA  AND TASHA 10/20/21    Pneumonia     Seizures     LAST SEIZURE WAS OVER 50-60 YEARS AGO       Past Surgical History:   Procedure Laterality Date    AMPUTATION DIGIT Right 9/3/2024    Procedure: AMPUTATION DIGIT RIGHT 3RD TOE;  Surgeon: Alton Fox DPM;  Location: [facility] MAIN OR;  Service: Podiatry;  Laterality: Right;    ELBOW PROCEDURE Left     EYE SURGERY Bilateral     KNEE SURGERY Bilateral     ARTHROSCOPY    TONSILLECTOMY           Current Outpatient Medications:     albuterol sulfate  (90 Base) MCG/ACT inhaler, Inhale 2 puffs Every 4 (Four) Hours As Needed for Wheezing or Shortness of Air., Disp: , Rfl:     ALPHA LIPOIC ACID PO, Take 1 capsule by mouth Daily., Disp: , Rfl:     aspirin 81 MG EC tablet, Take 1 tablet by mouth., Disp: , Rfl:     atorvastatin (LIPITOR) 80 MG tablet, Take 1 tablet by mouth Every Night., Disp: , Rfl:     BD Pen Needle Nubia U/F 32G X 4 MM misc, , Disp: , Rfl:     benzonatate (Tessalon Perles) 100 MG capsule, Take 1 capsule by mouth 3 (Three) Times a Day As Needed for Cough., Disp: 30 capsule, Rfl: 0    buPROPion XL (WELLBUTRIN XL) 300 MG 24 hr tablet, Take 1 tablet by mouth Every Morning., Disp: , Rfl:     cephalexin (Keflex) 500 MG capsule, Take 1 capsule by mouth 3 (Three) Times a Day for 10 days., Disp: 30 capsule, Rfl: 0    clopidogrel (PLAVIX) 75 MG tablet, Take 1 tablet by mouth Daily., Disp: , Rfl:     cyanocobalamin 1000 MCG/ML injection, 1 mL Every 28 (Twenty-Eight) Days., Disp: , Rfl:     dexAMETHasone (DECADRON) 4 MG tablet, Take 1 tablet by mouth Daily With Breakfast., Disp: 4 tablet, Rfl: 0    ELDERBERRY PO, Take  by mouth., Disp: , Rfl:     Entresto  MG tablet, Take 1 tablet by mouth 2 (Two) Times a Day., Disp: , Rfl:     esomeprazole (nexIUM) 40 MG packet,  "Take 40 mg by mouth Every Morning Before Breakfast for 180 days., Disp: 90 each, Rfl: 1    finasteride (PROSCAR) 5 MG tablet, Take 1 tablet by mouth Daily., Disp: , Rfl:     FREESTYLE LITE test strip, , Disp: , Rfl:     Jardiance 25 MG tablet tablet, Take 1 tablet by mouth Daily for 180 days., Disp: 90 tablet, Rfl: 1    Lancets (freestyle) lancets, , Disp: , Rfl:     metoprolol succinate XL (TOPROL-XL) 100 MG 24 hr tablet, Take 1 tablet by mouth 2 (Two) Times a Day., Disp: , Rfl:     Ozempic, 1 MG/DOSE, 4 MG/3ML solution pen-injector, Inject 1 mg under the skin into the appropriate area as directed 1 (One) Time Per Week., Disp: 9 mL, Rfl: 1    tamsulosin (FLOMAX) 0.4 MG capsule 24 hr capsule, Take 2 capsules by mouth Daily., Disp: 180 capsule, Rfl: 3    Tresiba FlexTouch 100 UNIT/ML solution pen-injector injection, Inject 25 Units under the skin into the appropriate area as directed Daily for 180 days., Disp: 30 mL, Rfl: 1    erythromycin (ROMYCIN) 5 MG/GM ophthalmic ointment, Administer  to the right eye Every Night. (Patient not taking: Reported on 2/13/2025), Disp: 3.5 g, Rfl: 0    No Known Allergies     History reviewed. No pertinent family history.    Social History     Socioeconomic History    Marital status:    Tobacco Use    Smoking status: Every Day     Current packs/day: 1.00     Average packs/day: 1 pack/day for 62.4 years (62.4 ttl pk-yrs)     Types: Cigarettes     Start date: 9/15/1962     Passive exposure: Current    Smokeless tobacco: Never   Vaping Use    Vaping status: Never Used   Substance and Sexual Activity    Alcohol use: Not Currently    Drug use: Never    Sexual activity: Defer       Vital Signs:   Ht 177.8 cm (70\")   Wt 84.8 kg (187 lb)   BMI 26.83 kg/m²      Physical Exam     Result Review :   The following data was reviewed by: JENNI Burgos on 02/13/2025:  Results for orders placed or performed in visit on 02/13/25   Bladder Scan    Collection Time: 02/13/25 10:54 AM "   Result Value Ref Range    Urine Volume 70ml     Urine Volume 113ml    POC Urinalysis Dipstick, Automated    Collection Time: 02/13/25 11:05 AM    Specimen: Urine   Result Value Ref Range    Color Yellow Yellow, Straw, Dark Yellow, Jody    Clarity, UA Clear Clear    Specific Gravity  1.025 1.005 - 1.030    pH, Urine 5.5 5.0 - 8.0    Leukocytes Negative Negative    Nitrite, UA Negative Negative    Protein, POC Negative Negative mg/dL    Glucose, UA >=1000 mg/dL (3+) (A) Negative mg/dL    Ketones, UA Negative Negative    Urobilinogen, UA 2.0 E.U./dL (A) Normal, 0.2 E.U./dL    Bilirubin Negative Negative    Blood, UA Negative Negative    Lot Number [id number]     Expiration Date 10/2,025       Bladder Scan interpretation 11/14/2024    Estimation of residual urine via BVI 3000 Verathon Bladder Scan  MA/nurse performing: nancie alcala Ma  Residual Urine: 70 ml  Indication: Benign prostatic hyperplasia without lower urinary tract symptoms   Position: Supine  Examination: Incremental scanning of the suprapubic area using 2.0 MHz transducer using copious amounts of acoustic gel.   Findings: An anechoic area was demonstrated which represented the bladder, with measurement of residual urine as noted. I inspected this myself. In that the residual urine was stable or insignificant, refer to plan for treatment and plan necessary at this time.            Procedures        Assessment and Plan    Diagnoses and all orders for this visit:    1. Benign prostatic hyperplasia without lower urinary tract symptoms (Primary)  -     POC Urinalysis Dipstick, Automated  -     Bladder Scan  -     Cystoscopy; Future        He would like to get set up for aquablation workup.    Ipss had been done and was 22.    We we will get him set up for a cystoscopy/TRUS with Dr. West and a uroflow test on the same day.    We will continue tamsulosin as the patient's symptoms are more vertiginous in nature rather than orthostatic I do suspect he is emptying  better since we took him off the oxybutynin.          I spent  minutes caring for Josemanuel on this date of service. This time includes time spent by me in the following activities:reviewing tests, obtaining and/or reviewing a separately obtained history, performing a medically appropriate examination and/or evaluation , counseling and educating the patient/family/caregiver, ordering medications, tests, or procedures, and documenting information in the medical record  Follow Up   Return for With Dr. West For Cystoscopy/ trus .  Patient was given instructions and counseling regarding his condition or for health maintenance advice. Please see specific information pulled into the AVS if appropriate.         This document has been electronically signed by JENNI Burgos  February 13, 2025 13:12 EST

## 2025-02-25 ENCOUNTER — HOSPITAL ENCOUNTER (OUTPATIENT)
Dept: GENERAL RADIOLOGY | Facility: HOSPITAL | Age: 80
Discharge: HOME | End: 2025-02-25
Admitting: PODIATRIST
Payer: MEDICARE

## 2025-02-25 ENCOUNTER — TELEPHONE (OUTPATIENT)
Dept: PODIATRY | Facility: CLINIC | Age: 80
End: 2025-02-25
Payer: MEDICARE

## 2025-02-25 PROCEDURE — 73610 X-RAY EXAM OF ANKLE: CPT

## 2025-02-25 NOTE — TELEPHONE ENCOUNTER
Caller:  LIONEL   Relationship to Patient: SELF  Phone Number: [phone]   Reason for Call: PATIENT STATES HE IS STILL HAVING SOME MILD PAIN AND TENDERNESS IN THE LEFT ANKLE HE FELL ON, STATES THAT THEIRS A TINGE WHEN HE STANDS ON IT.  HE IS WONDERING IF HE SHOULD GO OVER TO THE [facility] AND HAVE IT XR 'ED TO BE SAFE

## 2025-02-25 NOTE — TELEPHONE ENCOUNTER
Spoke to patient who states he continues to have some pain with weight bearing on left ankle. Requesting xray to be done. Advised and order has been placed for the xray. Dr Fox will review this tomorrow morning and we will call him with the results. Patient thanked me for the call.

## 2025-02-26 ENCOUNTER — TELEPHONE (OUTPATIENT)
Dept: PODIATRY | Facility: CLINIC | Age: 80
End: 2025-02-26
Payer: MEDICARE

## 2025-02-26 NOTE — TELEPHONE ENCOUNTER
Patient called the office 2/25/25  stating he was still having pain in the left ankle which he mentioned to Dr Fox on last office visit. Patient feels something may be broken. Per Dr Fox, xray order placed and patient will have this done at [facility] in [place]. Dr Fox reviewed xray today 2/26/25. Negative for fracture. Patient was informed. Advised ice elevation and rest. Advised he could come in and get an ankle brace which patient has done. Patient advised to call the office if no improvement.

## 2025-03-15 NOTE — PROGRESS NOTES
Procedures       Urinalysis was checked today and was negative for signs of infection      Cytoscopy Procedure:     Procedure: Flexible cytoscope was passed per urethra into the bladder without difficulty after proper consent. The bladder was inspected in a systematic meridian fashion.       4 cm prostate    Large bladder with minor to moderate trabeculation      There were no tumors, lesions, stones, or other abnormalities noted within the bladder. Of note, there was no increased vascularity as well. Both ureteral orifices were identified and were normal in appearance. The flexible cytoscope was removed. The patient tolerated the procedure well.           BPH      Patient moved from [place] a few months ago.  He did have a urologist in [place]      Main bother is urge incontinence/ wearing pads  Cont Flomax 0.8 and  finasteride 5  Nocturia x 1  Urgency/weak stream/frequency    Some issues with vertigo      3/25   Qmax  14  VV  327    PVR    11/24 070 2018 scrotal abscess with sepsis.  Required surgery/hyperbaric oxygen and wound care    History of oxybutynin - dry mouth    CAD with prior stenting/ischemic cardiomyopathy EF 35 to 40%  COPD  DM  On Ozempic    1 pack/day for 60 years    Aspirin 81/Plavix          BPH      Continue Flomax 0.8 and finasteride 5    Discussed possibility of Aquablation versus TURP today.  Risks and benefits were discussed including bleeding, infection and damage to the urinary system.  We also discussed the risk of anesthesia up to and including death.  Patient voiced understanding and would like to proceed.  Discussed the 1% risk of incontinence    He is worried about erections    Acquire records from previous urologist.    Cardiology clearance for Aquablation to come off Plavix    Follow-up in a few weeks for volume study

## 2025-03-17 ENCOUNTER — PROCEDURE VISIT (OUTPATIENT)
Dept: UROLOGY | Age: 80
End: 2025-03-17
Payer: MEDICARE

## 2025-03-17 VITALS — RESPIRATION RATE: 19 BRPM

## 2025-03-17 DIAGNOSIS — N40.0 BENIGN PROSTATIC HYPERPLASIA WITHOUT LOWER URINARY TRACT SYMPTOMS: ICD-10-CM

## 2025-03-17 PROCEDURE — 52000 CYSTOURETHROSCOPY: CPT | Performed by: UROLOGY

## 2025-03-18 ENCOUNTER — EDUCATION (OUTPATIENT)
Dept: DIABETES SERVICES | Facility: HOSPITAL | Age: 80
End: 2025-03-18

## 2025-03-18 DIAGNOSIS — E11.8 TYPE 2 DIABETES MELLITUS WITH UNSPECIFIED COMPLICATIONS: Primary | ICD-10-CM

## 2025-03-18 NOTE — PROGRESS NOTES
Patient seen today for Personal CGM -[place] [employer].  Discussed the difference between blood glucose and interstitial fluid glucose.  Patient understands that readings can vary as much as 20%.  We discussed If in Doubt get the fingerstick meter and double check glucose. Written materials given to patient.  Patient demonstrated how to set various CGM/sensor settings in the device.  Patient demonstrated how to insert the CGM/sensor. Instructed and demonstrated how to check for proper connection.  Discussed the practiced the process of clearing alarms.  Patient understands the importance of bringing CGM  or smartphone to all appointments.  Patient is scheduled for follow up to download and review reports and to review placement of CGM.    PHIL FERNANDEZ-AWHC, MLDE, CDCES      Time Started 9:35  Time Ended10:05

## 2025-03-23 NOTE — PROGRESS NOTES
Chief Complaint  Diabetes (Follow up, med mgt, A1C eval)    Referred By: Tino Roach MD    Subjective          Patient or patient representative verbalized consent for the use of Ambient Listening during the visit with  JENNI Villegas for chart documentation. 3/24/2025  14:22 EDT    Josemanuel De La Fuente presents to [facility] for diabetes medication management    History of Present Illness    History of Present Illness  The patient presents for evaluation of diabetes.    He has been monitoring his blood glucose levels daily, which have remained within acceptable limits. He experiences symptoms of hypoglycemia, such as dizziness and lightheadedness, particularly upon rising in the morning. His current medication regimen includes Jardiance 25 mg daily, Ozempic 1 mg weekly, and Tresiba 25 units daily.    Supplemental Information  He is concerned about his memory loss and wants to see a neurologist for dementia testing.    MEDICATIONS  Jardiance 25 mg daily, Ozempic 1 mg weekly, Tresiba 25 units daily      Visit type:  follow-up  Diabetes type:  Type 2  Current diabetes status/concerns/issues:  No concerns  Other health concerns: No new health concerns  Current Diabetes symptoms:    Polyuria: Yes     Polydipsia: Yes     Polyphagia: No   Blurred vision: Yes previous cataract surgery bilateral   Excessive fatigue: No  Known Diabetes complications:  Neuropathy: Numbness; Location: Feet, Hands, and Bilateral  Renal: Stage II mild (GFR = 60-89 mL/min) and Microalbuminuria - NEGATIVE  Eyes: No Retinopathy reported on current eye exam; Location: Bilateral  Amputation/Wounds:  Amputation of right third toe  GI: Reflux and Indigestion  Cardiovascular: Hypertension and CAD  ED: Patient Reported  Other: None  Hypoglycemia:  None reported at this time  Hypoglycemia Symptoms:  No hypoglycemia at this time  Current diabetes treatment:  Jardiance 25 mg daily, Ozempic 1 mg weekly, Tresiba 25  units daily  Blood glucose device:  Meter and Faustina CGM  Blood glucose monitoring frequency:  Continuous per CGM  Blood glucose range/average:  The 14-day sensor report shows an average glucose of 150 mg/dL, with 86% in target range ( mgdL), 12% in the high range (181-250 mg/dL), 2% in the very high range (>250 mg/dL), 0% in the low range (54-70 mg/dL) and 0% in the very low range (<54 mg/dL).   Glucose Source: Patient Reported  Diet:  Limits high carb/sweet foods, Avoids sugary drinks, Number of meals each day - 1-2; Number of snacks each day - occasional  Activity/Exercise:   As active as physically possible    Past Medical History:   Diagnosis Date    Chronic osteomyelitis     RIGHT FOOT    COPD (chronic obstructive pulmonary disease)     Coronary artery disease     REPORTS HAS APPT WITH DR SUGGS 10/2024. REPORTS HAD SEEN CARDIOLOGY [facility] IN [place] DR YURIY TRUJILLO CP/SOA. DECREASED ACTIVITY D/T RIGHT 3 RD TOE    Diabetes mellitus     Foot ulcer     GERD (gastroesophageal reflux disease)     Hyperlipidemia     Hypertension     Low back pain     PAD (peripheral artery disease)     DCB LEFT LEFT MID [facility]  AND [facility] 10/20/21    Pneumonia     Seizures     LAST SEIZURE WAS OVER 50-60 YEARS AGO     Past Surgical History:   Procedure Laterality Date    AMPUTATION DIGIT Right 9/3/2024    Procedure: AMPUTATION DIGIT RIGHT 3RD TOE;  Surgeon: Alton Fox DPM;  Location: [facility];  Service: Podiatry;  Laterality: Right;    ELBOW PROCEDURE Left     EYE SURGERY Bilateral     KNEE SURGERY Bilateral     ARTHROSCOPY    TONSILLECTOMY       History reviewed. No pertinent family history.  Social History     Socioeconomic History    Marital status:    Tobacco Use    Smoking status: Every Day     Current packs/day: 1.00     Average packs/day: 1 pack/day for 62.5 years (62.5 ttl pk-yrs)     Types: Cigarettes     Start date: 9/15/1962     Passive exposure: Current    Smokeless tobacco: Never   Vaping  Use    Vaping status: Never Used   Substance and Sexual Activity    Alcohol use: Not Currently    Drug use: Never    Sexual activity: Defer     No Known Allergies    Current Outpatient Medications:     albuterol sulfate  (90 Base) MCG/ACT inhaler, Inhale 2 puffs Every 4 (Four) Hours As Needed for Wheezing or Shortness of Air., Disp: , Rfl:     ALPHA LIPOIC ACID PO, Take 1 capsule by mouth Daily., Disp: , Rfl:     aspirin 81 MG EC tablet, Take 1 tablet by mouth Every Night., Disp: , Rfl:     atorvastatin (LIPITOR) 80 MG tablet, Take 1 tablet by mouth Every Night., Disp: , Rfl:     BD Pen Needle Nubia U/F 32G X 4 MM misc, , Disp: , Rfl:     buPROPion XL (WELLBUTRIN XL) 300 MG 24 hr tablet, Take 1 tablet by mouth Every Morning., Disp: , Rfl:     clopidogrel (PLAVIX) 75 MG tablet, Take 1 tablet by mouth Daily., Disp: , Rfl:     cyanocobalamin 1000 MCG/ML injection, 1 mL Every 28 (Twenty-Eight) Days., Disp: , Rfl:     ELDERBERRY PO, Take  by mouth., Disp: , Rfl:     Entresto  MG tablet, Take 1 tablet by mouth 2 (Two) Times a Day., Disp: , Rfl:     esomeprazole (nexIUM) 40 MG packet, Take 40 mg by mouth Every Morning Before Breakfast for 180 days., Disp: 90 each, Rfl: 1    finasteride (PROSCAR) 5 MG tablet, Take 1 tablet by mouth Daily., Disp: , Rfl:     Fluticasone-Umeclidin-Vilant (Trelegy Ellipta) 100-62.5-25 MCG/ACT inhaler, Inhale 1 puff Daily., Disp: , Rfl:     FREESTYLE LITE test strip, , Disp: , Rfl:     insulin degludec (Tresiba FlexTouch) 100 UNIT/ML solution pen-injector injection, Inject 25 Units under the skin into the appropriate area as directed Daily., Disp: 30 mL, Rfl: 1    Jardiance 25 MG tablet tablet, Take 1 tablet by mouth Daily., Disp: 90 tablet, Rfl: 1    Lancets (freestyle) lancets, , Disp: , Rfl:     metoprolol succinate XL (TOPROL-XL) 100 MG 24 hr tablet, Take 1 tablet by mouth 2 (Two) Times a Day., Disp: , Rfl:     Ozempic, 1 MG/DOSE, 4 MG/3ML solution pen-injector, Inject 1 mg under  "the skin into the appropriate area as directed 1 (One) Time Per Week., Disp: 9 mL, Rfl: 1    tamsulosin (FLOMAX) 0.4 MG capsule 24 hr capsule, Take 2 capsules by mouth Daily., Disp: 180 capsule, Rfl: 3    Objective     Vitals:    03/24/25 1332   BP: 127/66   BP Location: Right arm   Patient Position: Sitting   Cuff Size: Adult   Pulse: 78   SpO2: 96%   Weight: 85.2 kg (187 lb 14.4 oz)   Height: 177.8 cm (70\")     Body mass index is 26.96 kg/m².    Physical Exam  Constitutional:       Appearance: Normal appearance.      Comments: Overweight (BMI 25 - 29.9) Pt Current BMI = 26.96      HENT:      Head: Normocephalic and atraumatic.      Right Ear: External ear normal.      Left Ear: External ear normal.      Nose: Nose normal.   Eyes:      Extraocular Movements: Extraocular movements intact.      Conjunctiva/sclera: Conjunctivae normal.   Pulmonary:      Effort: Pulmonary effort is normal.   Musculoskeletal:         General: Normal range of motion.      Cervical back: Normal range of motion.   Skin:     General: Skin is warm and dry.   Neurological:      General: No focal deficit present.      Mental Status: He is alert and oriented to person, place, and time. Mental status is at baseline.   Psychiatric:         Mood and Affect: Mood normal.         Behavior: Behavior normal.         Thought Content: Thought content normal.         Judgment: Judgment normal.         Result Review :   The following data was reviewed by: JENNI Villegas on 03/19/2025:    Most Recent A1C          3/24/2025    14:04   HGBA1C Most Recent   Hemoglobin A1C 6.2        A1C Last 3 Results          7/25/2024    16:58 2/6/2025    14:51 3/24/2025    14:04   HGBA1C Last 3 Results   Hemoglobin A1C 6.30  6.20  6.2      A1c collected in the office today is 6.2%, indicating Controlled Type II diabetes.  This result is unchanged from the prior result of 6.2% collected on 2/6/2025    Glucose   Date Value Ref Range Status   03/24/2025 117 (H) 70 - 99 " mg/dL Final     Comment:     Serial Number: [id number]:  [id number]     Point of care glucose in the office today is within normal limits for nonfasting glucose    Creatinine   Date Value Ref Range Status   07/25/2024 0.86 0.76 - 1.27 mg/dL Final     eGFR   Date Value Ref Range Status   07/25/2024 89.7 >60.0 mL/min/1.73 Final     Labs collected on 7/25/2024 show Stage II mild (GFR = 60-89mL/min)    Microalbumin, Urine   Date Value Ref Range Status   02/06/2025 1.8 mg/dL Final   07/25/2024 <1.2 mg/dL Final     Creatinine, Urine   Date Value Ref Range Status   02/06/2025 67.7 mg/dL Final   07/25/2024 87.4 mg/dL Final     Microalbumin/Creatinine Ratio   Date Value Ref Range Status   02/06/2025 26.6 0.0 - 29.0 mg/g Final   07/25/2024   Final     Comment:     Unable to calculate     Urine microalbuminuria collected on 2/6/2025 is negative for microalbuminuria    Total Cholesterol   Date Value Ref Range Status   07/25/2024 103 0 - 200 mg/dL Final     Triglycerides   Date Value Ref Range Status   07/25/2024 54 0 - 150 mg/dL Final     HDL Cholesterol   Date Value Ref Range Status   07/25/2024 48 40 - 60 mg/dL Final     LDL Cholesterol    Date Value Ref Range Status   07/25/2024 42 0 - 100 mg/dL Final     Lipid panel collected on 7/25/2024 shows normal lipid panel            Diagnoses and all orders for this visit:    1. Controlled type 2 diabetes mellitus with hyperglycemia, with long-term current use of insulin (Primary)  -     POC Glucose  -     POC Glycosylated Hemoglobin (Hb A1C)  -     insulin degludec (Tresiba FlexTouch) 100 UNIT/ML solution pen-injector injection; Inject 25 Units under the skin into the appropriate area as directed Daily.  Dispense: 30 mL; Refill: 1  -     Jardiance 25 MG tablet tablet; Take 1 tablet by mouth Daily.  Dispense: 90 tablet; Refill: 1    2. Controlled type 2 diabetes mellitus with stage 2 chronic kidney disease, with long-term current use of insulin    3. Controlled type 2 diabetes  mellitus with diabetic polyneuropathy, with long-term current use of insulin    4. Hemoglobin A1c less than 7.0%    5. Overweight with body mass index (BMI) of 26 to 26.9 in adult    6. Type 2 diabetes mellitus with hyperglycemia, with long-term current use of insulin  -     Ozempic, 1 MG/DOSE, 4 MG/3ML solution pen-injector; Inject 1 mg under the skin into the appropriate area as directed 1 (One) Time Per Week.  Dispense: 9 mL; Refill: 1        Assessment & Plan  1. Diabetes Mellitus.  His average blood glucose level, as recorded by the sensor, is 150 mg/dL with a variability of 22.3%. He spends 86% of his time within the target range of 70 to 180 mg/dL, 12% of the time in the high range of 181 to 250 mg/dL, and 2% of the time in the very high range above 250 mg/dL. There have been no instances of hypoglycemia or severe hypoglycemia. His A1c levels have remained stable at 6.2%. He is currently on Jardiance 25 mg daily for kidney and heart protection, Ozempic 1 mg weekly, and Tresiba 25 units daily. He was advised to replace his Faustina 3 sensor upon receiving the expiration notification. The new sensor should be placed on his upper arm, and the Harvest 3 porter on his phone should be opened to initiate the sensor. He will feel his phone vibrate, indicating that the sensor has started. A 60-minute countdown for the warm-up period will then commence. Once the sensor is active on his phone, he will not need to use the standalone . This information will be provided in writing in his discharge papers.    Follow-up  The patient is scheduled for a follow-up visit in 3 months.    The patient will monitor his blood glucose levels per continuous glucose monitor.  If he develops problematic hyperglycemia or hypoglycemia or adverse drug reactions, he will contact the office for further instructions.        Follow Up     Return in about 3 months (around 6/24/2025) for CGM Follow-Up, Medication Management.    Patient was given  instructions and counseling regarding his condition or for health maintenance advice. Please see specific information pulled into the AVS if appropriate.     Klever Hubbard, APRN  03/24/2025    Dictated Utilizing Dragon Dictation.  Please note that portions of this note were completed with a voice recognition program.  Part of this note may be an electronic transcription/translation of spoken language to printed text using the Dragon Dictation System.

## 2025-03-24 ENCOUNTER — OFFICE VISIT (OUTPATIENT)
Dept: DIABETES SERVICES | Facility: HOSPITAL | Age: 80
End: 2025-03-24
Payer: MEDICARE

## 2025-03-24 VITALS
WEIGHT: 187.9 LBS | DIASTOLIC BLOOD PRESSURE: 66 MMHG | SYSTOLIC BLOOD PRESSURE: 127 MMHG | HEART RATE: 78 BPM | BODY MASS INDEX: 26.9 KG/M2 | OXYGEN SATURATION: 96 % | HEIGHT: 70 IN

## 2025-03-24 DIAGNOSIS — Z79.4 CONTROLLED TYPE 2 DIABETES MELLITUS WITH DIABETIC POLYNEUROPATHY, WITH LONG-TERM CURRENT USE OF INSULIN: ICD-10-CM

## 2025-03-24 DIAGNOSIS — E11.65 CONTROLLED TYPE 2 DIABETES MELLITUS WITH HYPERGLYCEMIA, WITH LONG-TERM CURRENT USE OF INSULIN: Primary | ICD-10-CM

## 2025-03-24 DIAGNOSIS — Z79.4 CONTROLLED TYPE 2 DIABETES MELLITUS WITH HYPERGLYCEMIA, WITH LONG-TERM CURRENT USE OF INSULIN: Primary | ICD-10-CM

## 2025-03-24 DIAGNOSIS — E11.65 TYPE 2 DIABETES MELLITUS WITH HYPERGLYCEMIA, WITH LONG-TERM CURRENT USE OF INSULIN: ICD-10-CM

## 2025-03-24 DIAGNOSIS — Z79.4 CONTROLLED TYPE 2 DIABETES MELLITUS WITH STAGE 2 CHRONIC KIDNEY DISEASE, WITH LONG-TERM CURRENT USE OF INSULIN: ICD-10-CM

## 2025-03-24 DIAGNOSIS — N18.2 CONTROLLED TYPE 2 DIABETES MELLITUS WITH STAGE 2 CHRONIC KIDNEY DISEASE, WITH LONG-TERM CURRENT USE OF INSULIN: ICD-10-CM

## 2025-03-24 DIAGNOSIS — E11.42 CONTROLLED TYPE 2 DIABETES MELLITUS WITH DIABETIC POLYNEUROPATHY, WITH LONG-TERM CURRENT USE OF INSULIN: ICD-10-CM

## 2025-03-24 DIAGNOSIS — E11.22 CONTROLLED TYPE 2 DIABETES MELLITUS WITH STAGE 2 CHRONIC KIDNEY DISEASE, WITH LONG-TERM CURRENT USE OF INSULIN: ICD-10-CM

## 2025-03-24 DIAGNOSIS — R73.09 HEMOGLOBIN A1C LESS THAN 7.0%: ICD-10-CM

## 2025-03-24 DIAGNOSIS — Z79.4 TYPE 2 DIABETES MELLITUS WITH HYPERGLYCEMIA, WITH LONG-TERM CURRENT USE OF INSULIN: ICD-10-CM

## 2025-03-24 DIAGNOSIS — E66.3 OVERWEIGHT WITH BODY MASS INDEX (BMI) OF 26 TO 26.9 IN ADULT: ICD-10-CM

## 2025-03-24 LAB
EXPIRATION DATE: ABNORMAL
GLUCOSE BLDC GLUCOMTR-MCNC: 117 MG/DL (ref 70–99)
HBA1C MFR BLD: 6.2 % (ref 4.5–5.7)
Lab: ABNORMAL

## 2025-03-24 PROCEDURE — 82948 REAGENT STRIP/BLOOD GLUCOSE: CPT

## 2025-03-24 PROCEDURE — 3078F DIAST BP <80 MM HG: CPT

## 2025-03-24 PROCEDURE — 1159F MED LIST DOCD IN RCRD: CPT

## 2025-03-24 PROCEDURE — 83036 HEMOGLOBIN GLYCOSYLATED A1C: CPT

## 2025-03-24 PROCEDURE — G0463 HOSPITAL OUTPT CLINIC VISIT: HCPCS

## 2025-03-24 PROCEDURE — G2211 COMPLEX E/M VISIT ADD ON: HCPCS

## 2025-03-24 PROCEDURE — 3074F SYST BP LT 130 MM HG: CPT

## 2025-03-24 PROCEDURE — 1160F RVW MEDS BY RX/DR IN RCRD: CPT

## 2025-03-24 PROCEDURE — 99214 OFFICE O/P EST MOD 30 MIN: CPT

## 2025-03-24 RX ORDER — INSULIN DEGLUDEC 100 U/ML
25 INJECTION, SOLUTION SUBCUTANEOUS DAILY
Qty: 30 ML | Refills: 1 | Status: SHIPPED | OUTPATIENT
Start: 2025-03-24

## 2025-03-24 RX ORDER — FLUTICASONE FUROATE, UMECLIDINIUM BROMIDE AND VILANTEROL TRIFENATATE 100; 62.5; 25 UG/1; UG/1; UG/1
1 POWDER RESPIRATORY (INHALATION)
COMMUNITY

## 2025-03-24 RX ORDER — INSULIN DEGLUDEC 100 U/ML
25 INJECTION, SOLUTION SUBCUTANEOUS DAILY
COMMUNITY
End: 2025-03-24 | Stop reason: SDUPTHER

## 2025-03-24 RX ORDER — EMPAGLIFLOZIN 25 MG/1
25 TABLET, FILM COATED ORAL DAILY
Qty: 90 TABLET | Refills: 1 | Status: SHIPPED | OUTPATIENT
Start: 2025-03-24

## 2025-03-24 RX ORDER — SEMAGLUTIDE 1.34 MG/ML
1 INJECTION, SOLUTION SUBCUTANEOUS WEEKLY
Qty: 9 ML | Refills: 1 | Status: SHIPPED | OUTPATIENT
Start: 2025-03-24

## 2025-03-24 NOTE — PATIENT INSTRUCTIONS
Faustina 3:    When your current faustina 3 sensor expires by the notification that you receive on the , you will remove that sensor and throw it in the garbage.  When you place the new sensor on your upper arm, you will then open the faustina 3 porter on your phone, and then you will want the back of your phone around the camera against that sensor and you will feel your phone vibrate and it will tell you that it is started the sensor.  It will then give you a 60-minute countdown for the warm up period.  Once you start the sensor on your phone you will not have to use the stand-alone .

## 2025-03-25 DIAGNOSIS — K21.9 GASTROESOPHAGEAL REFLUX DISEASE WITHOUT ESOPHAGITIS: ICD-10-CM

## 2025-03-25 RX ORDER — ESOMEPRAZOLE MAGNESIUM 40 MG/1
40 GRANULE, DELAYED RELEASE ORAL
Qty: 90 EACH | Refills: 1 | Status: SHIPPED | OUTPATIENT
Start: 2025-03-25 | End: 2025-09-21

## 2025-03-25 NOTE — TELEPHONE ENCOUNTER
Caller: [facility] - [place], [place] - [address] [place] [address] - [phone] PH - [phone] FX    Relationship: Pharmacy    Requested Prescriptions:   Requested Prescriptions     Pending Prescriptions Disp Refills    esomeprazole (nexIUM) 40 MG packet 90 each 1     Sig: Take 40 mg by mouth Every Morning Before Breakfast for 180 days.        Pharmacy where request should be sent: [facility] - [place] [phone] [place], [place] [phone] FX     Last office visit with prescribing clinician: 1/28/2025   Last telemedicine visit with prescribing clinician: Visit date not found   Next office visit with prescribing clinician: Visit date not found     Does the patient have less than a 3 day supply:  [x] Yes  [] No    Gerardo Bearden Rep   03/25/25 11:54 EDT

## 2025-03-29 NOTE — TELEPHONE ENCOUNTER
[facility] received a fax that requires your attention. The document has been indexed to the patient’s chart for your review.      Reason for sending: EXTERNAL MEDICAL RECORD NOTIFICATION     Documents Description: ATORVASTATIN NEW RX REQ-[facility]-3.28.25    Name of Sender: [facility]     Date Indexed: 3.28.25

## 2025-03-31 DIAGNOSIS — J43.9 PULMONARY EMPHYSEMA, UNSPECIFIED EMPHYSEMA TYPE: Primary | ICD-10-CM

## 2025-03-31 DIAGNOSIS — I25.5 ISCHEMIC CARDIOMYOPATHY: ICD-10-CM

## 2025-03-31 RX ORDER — ATORVASTATIN CALCIUM 80 MG/1
80 TABLET, FILM COATED ORAL NIGHTLY
Qty: 90 TABLET | Refills: 3 | Status: SHIPPED | OUTPATIENT
Start: 2025-03-31

## 2025-03-31 NOTE — TELEPHONE ENCOUNTER
Caller: [facility] - [phone] [facility] [phone] FX    Relationship: Pharmacy         Requested Prescriptions:   Requested Prescriptions     Pending Prescriptions Disp Refills    Fluticasone-Umeclidin-Vilant (Trelegy Ellipta) 100-62.5-25 MCG/ACT inhaler       Sig: Inhale 1 puff Daily.    Entresto  MG tablet 60 tablet      Sig: Take 1 tablet by mouth 2 (Two) Times a Day.        Pharmacy where request should be sent: [facility] [place] [phone] [facility] [phone] FX     Last office visit with prescribing clinician: 1/28/2025   Last telemedicine visit with prescribing clinician: Visit date not found   Next office visit with prescribing clinician: Visit date not found     Additional details provided by patient:      [facility] IS REQUESTING A 90 DAYS SUPPLY      Gerardo Hays Rep   03/31/25 16:18 EDT

## 2025-04-01 RX ORDER — FLUTICASONE FUROATE, UMECLIDINIUM BROMIDE AND VILANTEROL TRIFENATATE 100; 62.5; 25 UG/1; UG/1; UG/1
1 POWDER RESPIRATORY (INHALATION)
Qty: 90 EACH | Refills: 1 | Status: SHIPPED | OUTPATIENT
Start: 2025-04-01 | End: 2025-06-30

## 2025-04-01 RX ORDER — SACUBITRIL AND VALSARTAN 97; 103 MG/1; MG/1
1 TABLET, FILM COATED ORAL 2 TIMES DAILY
Qty: 180 TABLET | Refills: 1 | Status: SHIPPED | OUTPATIENT
Start: 2025-04-01 | End: 2025-09-28

## 2025-04-07 ENCOUNTER — OFFICE VISIT (OUTPATIENT)
Dept: PODIATRY | Facility: CLINIC | Age: 80
End: 2025-04-07
Payer: MEDICARE

## 2025-04-07 VITALS
OXYGEN SATURATION: 97 % | WEIGHT: 189 LBS | HEART RATE: 83 BPM | BODY MASS INDEX: 27.06 KG/M2 | DIASTOLIC BLOOD PRESSURE: 70 MMHG | HEIGHT: 70 IN | SYSTOLIC BLOOD PRESSURE: 124 MMHG

## 2025-04-07 DIAGNOSIS — M20.42 HAMMER TOE OF LEFT FOOT: ICD-10-CM

## 2025-04-07 DIAGNOSIS — B35.1 ONYCHOMYCOSIS: ICD-10-CM

## 2025-04-07 DIAGNOSIS — E08.59 DIABETES MELLITUS DUE TO UNDERLYING CONDITION WITH OTHER CIRCULATORY COMPLICATIONS: Primary | ICD-10-CM

## 2025-04-08 NOTE — PROGRESS NOTES
[facility] - PODIATRY    Today's Date: 04/08/25    Patient Name: Josemanuel De La Fuente  MRN: [id number]  CSN: [id number]  PCP: Tino Roach MD,   Referring Provider: No ref. provider found    SUBJECTIVE     Chief Complaint   Patient presents with    Left Foot - Follow-up, Diabetes, Nail Problem     Recent blood sugar   122     Right Foot - Follow-up, Diabetes, Nail Problem     HPI: Josemanuel De La Fuente, a 79 y.o.male, presents to clinic.    Patient is here for follow-up.  States last recent blood sugar was 122.  Patient states that his left second toe still bothers him at times.  Patient states he can rub.  Patient says that his sugars are well-controlled still has numbness in his feet.    Past Medical History:   Diagnosis Date    Chronic osteomyelitis     RIGHT FOOT    COPD (chronic obstructive pulmonary disease)     Coronary artery disease 2018/2019    REPORTS HAS APPT WITH DR SUGGS 10/2024. REPORTS HAD SEEN CARDIOLOGY [facility] IN [place] DR YURIY TRUJILLO CP/SOA. DECREASED ACTIVITY D/T RIGHT 3 RD TOE    Diabetes mellitus 2016/2018    [facility]/[place] Chas Min/Sadia Espinosa[place]    Foot ulcer     GERD (gastroesophageal reflux disease)     Hyperlipidemia 2019    Hypertension 2000    Low back pain     PAD (peripheral artery disease)     DCB LEFT LEFT MID SFA  AND TASHA 10/20/21    Pneumonia     Seizures     LAST SEIZURE WAS OVER 50-60 YEARS AGO    Type 2 diabetes mellitus 2017    Urinary incontinence 2020     Past Surgical History:   Procedure Laterality Date    AMPUTATION DIGIT Right 09/03/2024    Procedure: AMPUTATION DIGIT RIGHT 3RD TOE;  Surgeon: Alton Fox DPM;  Location: [facility];  Service: Podiatry;  Laterality: Right;    CYSTOSCOPY  2017    Dr Josemanuel Mccurdy [facility]    ELBOW PROCEDURE Left     EYE SURGERY Bilateral     KNEE SURGERY Bilateral     ARTHROSCOPY    TONSILLECTOMY       Family History   Problem Relation Age of Onset    Cancer Mother      Hypertension Mother     Anemia Mother     Cancer Father      Social History     Socioeconomic History    Marital status:    Tobacco Use    Smoking status: Every Day     Current packs/day: 1.00     Average packs/day: 1 pack/day for 62.6 years (62.6 ttl pk-yrs)     Types: Cigarettes     Start date: 9/15/1962     Passive exposure: Current    Smokeless tobacco: Never   Vaping Use    Vaping status: Never Used   Substance and Sexual Activity    Alcohol use: Not Currently    Drug use: Never    Sexual activity: Defer     No Known Allergies  Current Outpatient Medications   Medication Sig Dispense Refill    albuterol sulfate  (90 Base) MCG/ACT inhaler Inhale 2 puffs Every 4 (Four) Hours As Needed for Wheezing or Shortness of Air.      ALPHA LIPOIC ACID PO Take 1 capsule by mouth Daily.      aspirin 81 MG EC tablet Take 1 tablet by mouth Every Night.      atorvastatin (LIPITOR) 80 MG tablet Take 1 tablet by mouth Every Night. 90 tablet 3    BD Pen Needle Nubia U/F 32G X 4 MM misc       buPROPion XL (WELLBUTRIN XL) 300 MG 24 hr tablet Take 1 tablet by mouth Every Morning.      clopidogrel (PLAVIX) 75 MG tablet Take 1 tablet by mouth Daily.      cyanocobalamin 1000 MCG/ML injection 1 mL Every 28 (Twenty-Eight) Days.      ELDERBERRY PO Take  by mouth.      Entresto  MG tablet Take 1 tablet by mouth 2 (Two) Times a Day for 180 days. 180 tablet 1    esomeprazole (nexIUM) 40 MG packet Take 40 mg by mouth Every Morning Before Breakfast for 180 days. 90 each 1    finasteride (PROSCAR) 5 MG tablet Take 1 tablet by mouth Daily.      Fluticasone-Umeclidin-Vilant (Trelegy Ellipta) 100-62.5-25 MCG/ACT inhaler Inhale 1 puff Daily for 90 days. 90 each 1    FREESTYLE LITE test strip       insulin degludec (Tresiba FlexTouch) 100 UNIT/ML solution pen-injector injection Inject 25 Units under the skin into the appropriate area as directed Daily. 30 mL 1    Jardiance 25 MG tablet tablet Take 1 tablet by mouth Daily. 90  tablet 1    Lancets (freestyle) lancets       metoprolol succinate XL (TOPROL-XL) 100 MG 24 hr tablet Take 1 tablet by mouth 2 (Two) Times a Day.      Ozempic, 1 MG/DOSE, 4 MG/3ML solution pen-injector Inject 1 mg under the skin into the appropriate area as directed 1 (One) Time Per Week. 9 mL 1    tamsulosin (FLOMAX) 0.4 MG capsule 24 hr capsule Take 2 capsules by mouth Daily. 180 capsule 3     No current facility-administered medications for this visit.     Review of Systems   Constitutional: Negative.    Skin:         Painful toenails   Neurological:  Positive for numbness.   All other systems reviewed and are negative.      OBJECTIVE     Vitals:    04/07/25 1234   BP: 124/70   Pulse: 83   SpO2: 97%       WBC   Date Value Ref Range Status   07/25/2024 10.39 3.40 - 10.80 10*3/mm3 Final   08/06/2020 8.60 3.40 - 10.80 10*3/µL Final     RBC   Date Value Ref Range Status   07/25/2024 4.78 4.14 - 5.80 10*6/mm3 Final   08/06/2020 4.63 4.30 - 6.10 10*6/µL Final     Hemoglobin   Date Value Ref Range Status   07/25/2024 16.0 13.0 - 17.7 g/dL Final   08/06/2020 15.5 14.0 - 18.0 g/dL Final     Hematocrit   Date Value Ref Range Status   07/25/2024 47.8 37.5 - 51.0 % Final   08/06/2020 45.2 38.0 - 49.0 % Final     MCV   Date Value Ref Range Status   07/25/2024 100.0 (H) 79.0 - 97.0 fL Final   08/06/2020 97.6 (H) 80.0 - 96.0 fL Final     MCH   Date Value Ref Range Status   07/25/2024 33.5 (H) 26.6 - 33.0 pg Final   08/06/2020 33.4 26.0 - 34.0 pg Final     MCHC   Date Value Ref Range Status   07/25/2024 33.5 31.5 - 35.7 g/dL Final   08/06/2020 34.2 32.0 - 36.0 g/dL Final     RDW   Date Value Ref Range Status   07/25/2024 13.2 12.3 - 15.4 % Final   08/06/2020 13.9 11.5 - 15.5 % Final     RDW-SD   Date Value Ref Range Status   07/25/2024 48.1 37.0 - 54.0 fl Final     MPV   Date Value Ref Range Status   07/25/2024 10.9 6.0 - 12.0 fL Final   08/06/2020 9.3 6.0 - 9.5 fL Final     Platelets   Date Value Ref Range Status   07/25/2024  150 140 - 450 10*3/mm3 Final   08/06/2020 127 (L) 150 - 440 10*3/µL Final     Neutrophil %   Date Value Ref Range Status   07/25/2024 74.6 42.7 - 76.0 % Final     Lymphocyte %   Date Value Ref Range Status   07/25/2024 15.0 (L) 19.6 - 45.3 % Final     Monocyte %   Date Value Ref Range Status   07/25/2024 8.1 5.0 - 12.0 % Final     Eosinophil %   Date Value Ref Range Status   07/25/2024 1.4 0.3 - 6.2 % Final     Basophil %   Date Value Ref Range Status   07/25/2024 0.5 0.0 - 1.5 % Final     Immature Grans %   Date Value Ref Range Status   07/25/2024 0.4 0.0 - 0.5 % Final     Neutrophils, Absolute   Date Value Ref Range Status   07/25/2024 7.75 (H) 1.70 - 7.00 10*3/mm3 Final     Lymphocytes, Absolute   Date Value Ref Range Status   07/25/2024 1.56 0.70 - 3.10 10*3/mm3 Final     Monocytes, Absolute   Date Value Ref Range Status   07/25/2024 0.84 0.10 - 0.90 10*3/mm3 Final     Eosinophils, Absolute   Date Value Ref Range Status   07/25/2024 0.15 0.00 - 0.40 10*3/mm3 Final     Basophils, Absolute   Date Value Ref Range Status   07/25/2024 0.05 0.00 - 0.20 10*3/mm3 Final     Immature Grans, Absolute   Date Value Ref Range Status   07/25/2024 0.04 0.00 - 0.05 10*3/mm3 Final     nRBC   Date Value Ref Range Status   07/25/2024 0.0 0.0 - 0.2 /100 WBC Final         Lab Results   Component Value Date    GLUCOSE 121 (H) 07/25/2024    BUN 13 07/25/2024    CREATININE 0.86 07/25/2024    BCR 15.1 07/25/2024    K 4.2 07/25/2024    CO2 23.6 07/25/2024    CALCIUM 8.5 (L) 07/25/2024    ALBUMIN 4.0 07/25/2024    AST 25 07/25/2024    ALT 27 07/25/2024       Patient seen in no apparent distress.      PHYSICAL EXAM:     Foot/Ankle Exam    GENERAL  Appearance:  appears stated age  Orientation:  AAOx3  Affect:  appropriate  Gait:  unimpaired  Assistance:  independent  Right shoe gear: casual shoe  Left shoe gear: casual shoe    VASCULAR     Right Foot Vascularity   Normal vascular exam    Dorsalis pedis:  2+  Posterior tibial:  2+  Skin  temperature:  warm  Edema grading:  None  CFT:  < 3 seconds  Pedal hair growth:  Present  Varicosities:  none     Left Foot Vascularity   Normal vascular exam    Dorsalis pedis:  2+  Posterior tibial:  2+  Skin temperature:  warm  Edema grading:  None  CFT:  < 3 seconds  Pedal hair growth:  Present  Varicosities:  none     NEUROLOGIC     Right Foot Neurologic   Normal sensation    Light touch sensation: normal  Vibratory sensation: normal  Hot/Cold sensation: normal  Protective Sensation using Holiday-Marshal Monofilament:   Sites intact: 10  Sites tested: 10     Left Foot Neurologic   Normal sensation    Light touch sensation: normal  Vibratory sensation: normal  Hot/Cold sensation:  normal  Protective Sensation using Holiday-Marshal Monofilament:   Sites intact: 10  Sites tested: 10    MUSCULOSKELETAL     Left Foot Musculoskeletal   Hammertoe:  Second toe (Callus to first interspace.)    MUSCLE STRENGTH     Right Foot Muscle Strength   Foot dorsiflexion:  4  Foot plantar flexion:  4  Foot inversion:  4  Foot eversion:  4     Left Foot Muscle Strength   Foot dorsiflexion:  4  Foot plantar flexion:  4  Foot inversion:  4  Foot eversion:  4    RANGE OF MOTION     Right Foot Range of Motion   Foot and ankle ROM within normal limits       Left Foot Range of Motion   Foot and ankle ROM within normal limits      DERMATOLOGIC      Right Foot Dermatologic   Skin  Right foot skin is intact.   Nails  1.  Positive for elongated, onychomycosis, abnormal thickness, subungual debris and ingrown toenail.  2.  Positive for elongated, onychomycosis, abnormal thickness, subungual debris and ingrown toenail.  3.  Positive for elongated, onychomycosis, abnormal thickness, subungual debris and ingrown toenail.  4.  Positive for elongated, onychomycosis, abnormal thickness, subungual debris and ingrown toenail.  5.  Positive for elongated, onychomycosis, abnormal thickness, subungual debris and ingrown toenail.  Nails comment:   Toenails 1, 2, 3, 4, and 5     Left Foot Dermatologic   Skin  Left foot skin is intact.   Nails comment:  Toenails 1, 2, 3, 4, and 5  Nails  1.  Positive for elongated, onychomycosis, abnormal thickness, subungual debris and ingrown toenail.  2.  Positive for elongated, onychomycosis, abnormal thickness, subungual debris and ingrown toenail.  3.  Positive for elongated, onychomycosis, abnormal thickness, subungual debris and ingrown toenail.  4.  Positive for elongated, onychomycosis, abnormally thick, subungual debris and ingrown toenail.  5.  Positive for elongated, onychomycosis, abnormally thick, subungual debris and ingrown toenail.      RADIOLOGY:        No results found.    ASSESSMENT/PLAN     Diagnoses and all orders for this visit:    1. 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 Onychomycosis    3. Hammer toe of left foot          Comprehensive lower extremity examination and evaluation was performed.    Toe spacer to first interspace.  Discussed using it if symptoms worsen or area becomes open.  Discussed importance of daily foot checks tight glycemic control.    Toenails 1, 2, 3, 4, 5 on Right and 1, 2, 3, 4, 5 on Left were debrided with nail nippers. Patient tolerated procedure well without complications.    Patient return to clinic in 3 months.     Discussed findings and treatment plan including risks, benefits, and treatment options with patient in detail. Patient agreed with treatment plan.    Medications and allergies reviewed.  Reviewed available lab values along with other pertinent labs.  These were discussed with the patient.    All questions were answered to patient/family satisfaction. Should symptoms fail to improve or worsen they agree to call or return to clinic or to go to the Emergency Department. Discussed the importance of following up with any needed screening tests/labs/specialist appointments and any requested follow-up  recommended by me today. Importance of maintaining follow-up discussed and patient accepts that missed appointments can delay diagnosis and potentially lead to worsening of conditions.    Return in about 3 months (around 7/7/2025)., or sooner if acute issues arise.    This document has been electronically signed by Alton Fox DPM on April 8, 2025 07:35 EDT

## 2025-04-11 ENCOUNTER — TELEPHONE (OUTPATIENT)
Dept: UROLOGY | Age: 80
End: 2025-04-11
Payer: MEDICARE

## 2025-04-11 NOTE — TELEPHONE ENCOUNTER
Caller: LIONEL NELSON    Relationship: SELF    Best call back number: [phone]    What is your medical concern? INCOMING CALL FROM PT. PT WOULD LIKE A CALLBACK TO DISCUSS HIS UPCOMING PROCEDURE 3-4WKS F/U /TRUS TO DISCUSS SURGERY/RMB/ OFFICE RS FROM 4/28/ MJS SAID HE JUST WANTS TO MAKE SURE HE IS ON THE SAME PAGE.    How long has this issue been going on? N/A    Is your provider already aware of this issue? N/A    Have you been treated for this issue? N/A

## 2025-04-15 NOTE — TELEPHONE ENCOUNTER
Called pt. He had questions about what would be happening at his appt on 5/6. I explained that he will be having a rectal ultrasound of his prostate done to complete all measurements of it. If he is candidate based on the prostate size, we will schedule surgery that day.

## 2025-04-17 ENCOUNTER — TELEPHONE (OUTPATIENT)
Dept: UROLOGY | Age: 80
End: 2025-04-17
Payer: MEDICARE

## 2025-04-17 DIAGNOSIS — E11.65 TYPE 2 DIABETES MELLITUS WITH HYPERGLYCEMIA, WITH LONG-TERM CURRENT USE OF INSULIN: ICD-10-CM

## 2025-04-17 DIAGNOSIS — Z79.4 TYPE 2 DIABETES MELLITUS WITH HYPERGLYCEMIA, WITH LONG-TERM CURRENT USE OF INSULIN: ICD-10-CM

## 2025-04-17 DIAGNOSIS — N40.0 BENIGN PROSTATIC HYPERPLASIA WITHOUT LOWER URINARY TRACT SYMPTOMS: Primary | ICD-10-CM

## 2025-04-17 RX ORDER — SEMAGLUTIDE 1.34 MG/ML
1 INJECTION, SOLUTION SUBCUTANEOUS WEEKLY
Qty: 9 ML | Refills: 1 | Status: SHIPPED | OUTPATIENT
Start: 2025-04-17

## 2025-04-17 RX ORDER — FINASTERIDE 5 MG/1
5 TABLET, FILM COATED ORAL DAILY
Qty: 90 TABLET | Refills: 3 | Status: SHIPPED | OUTPATIENT
Start: 2025-04-17

## 2025-04-17 NOTE — TELEPHONE ENCOUNTER
PATIENT RECEIVED A CALL FROM [facility] THAT HIS FINASTERIDE HAD BEEN CANCELLED. HE THINKS THAT IT COULD BE A MISTAKE THAT WAS MADE. CAN A NEW SCRIPT BE SENT TO [facility] FOR HIM. HE HAS SEEN [facility] AND DR APARICIO. PLEASE LET HIM KNOW IF IT IS SENT.

## 2025-04-21 ENCOUNTER — OFFICE VISIT (OUTPATIENT)
Dept: CARDIOLOGY | Facility: CLINIC | Age: 80
End: 2025-04-21
Payer: MEDICARE

## 2025-04-21 VITALS
HEART RATE: 72 BPM | SYSTOLIC BLOOD PRESSURE: 136 MMHG | HEIGHT: 70 IN | BODY MASS INDEX: 26.77 KG/M2 | DIASTOLIC BLOOD PRESSURE: 77 MMHG | WEIGHT: 187 LBS

## 2025-04-21 DIAGNOSIS — Z98.61 CAD S/P PERCUTANEOUS CORONARY ANGIOPLASTY: ICD-10-CM

## 2025-04-21 DIAGNOSIS — E78.2 MIXED HYPERLIPIDEMIA: ICD-10-CM

## 2025-04-21 DIAGNOSIS — I25.10 CAD S/P PERCUTANEOUS CORONARY ANGIOPLASTY: ICD-10-CM

## 2025-04-21 DIAGNOSIS — I25.5 ISCHEMIC CARDIOMYOPATHY: Primary | ICD-10-CM

## 2025-04-21 DIAGNOSIS — I10 PRIMARY HYPERTENSION: ICD-10-CM

## 2025-04-21 PROCEDURE — 99214 OFFICE O/P EST MOD 30 MIN: CPT | Performed by: INTERNAL MEDICINE

## 2025-04-21 PROCEDURE — 3078F DIAST BP <80 MM HG: CPT | Performed by: INTERNAL MEDICINE

## 2025-04-21 PROCEDURE — 3075F SYST BP GE 130 - 139MM HG: CPT | Performed by: INTERNAL MEDICINE

## 2025-04-21 PROCEDURE — G2211 COMPLEX E/M VISIT ADD ON: HCPCS | Performed by: INTERNAL MEDICINE

## 2025-04-21 NOTE — PROGRESS NOTES
Chief Complaint  Follow-up    Subjective    Patient has been doing well not been having anginal chest pain stable shortness of breath issues.  He also reports some bilateral leg soreness but no chelsea claudication symptoms  Past Medical History:   Diagnosis Date    Chronic osteomyelitis     RIGHT FOOT    COPD (chronic obstructive pulmonary disease)     Coronary artery disease 2018/2019    REPORTS HAS APPT WITH DR SUGGS 10/2024. REPORTS HAD SEEN CARDIOLOGY [facility] IN [place] DR YURIY TRUJILLO CP/SOA. DECREASED ACTIVITY D/T RIGHT 3 RD TOE    Diabetes mellitus 2016/2018    [place] [facility]/[place] Chas Min/Sadia Espinosa[place]    Foot ulcer     GERD (gastroesophageal reflux disease)     Hyperlipidemia 2019    Hypertension 2000    Low back pain     PAD (peripheral artery disease)     DCB LEFT LEFT MID [facility]  AND TASHA 10/20/21    Pneumonia     Seizures     LAST SEIZURE WAS OVER 50-60 YEARS AGO    Type 2 diabetes mellitus 2017    Urinary incontinence 2020         Current Outpatient Medications:     albuterol sulfate  (90 Base) MCG/ACT inhaler, Inhale 2 puffs Every 4 (Four) Hours As Needed for Wheezing or Shortness of Air., Disp: , Rfl:     ALPHA LIPOIC ACID PO, Take 1 capsule by mouth Daily., Disp: , Rfl:     aspirin 81 MG EC tablet, Take 1 tablet by mouth Every Night., Disp: , Rfl:     atorvastatin (LIPITOR) 80 MG tablet, Take 1 tablet by mouth Every Night., Disp: 90 tablet, Rfl: 3    BD Pen Needle Nubia U/F 32G X 4 MM misc, , Disp: , Rfl:     buPROPion XL (WELLBUTRIN XL) 300 MG 24 hr tablet, Take 1 tablet by mouth Every Morning., Disp: , Rfl:     clopidogrel (PLAVIX) 75 MG tablet, Take 1 tablet by mouth Daily., Disp: , Rfl:     cyanocobalamin 1000 MCG/ML injection, 1 mL Every 28 (Twenty-Eight) Days., Disp: , Rfl:     ELDERBERRY PO, Take  by mouth., Disp: , Rfl:     Entresto  MG tablet, Take 1 tablet by mouth 2 (Two) Times a Day for 180 days., Disp: 180 tablet, Rfl: 1    esomeprazole (nexIUM) 40 MG  "packet, Take 40 mg by mouth Every Morning Before Breakfast for 180 days., Disp: 90 each, Rfl: 1    finasteride (PROSCAR) 5 MG tablet, Take 1 tablet by mouth Daily., Disp: 90 tablet, Rfl: 3    Fluticasone-Umeclidin-Vilant (Trelegy Ellipta) 100-62.5-25 MCG/ACT inhaler, Inhale 1 puff Daily for 90 days., Disp: 90 each, Rfl: 1    FREESTYLE LITE test strip, , Disp: , Rfl:     insulin degludec (Tresiba FlexTouch) 100 UNIT/ML solution pen-injector injection, Inject 25 Units under the skin into the appropriate area as directed Daily., Disp: 30 mL, Rfl: 1    Jardiance 25 MG tablet tablet, Take 1 tablet by mouth Daily., Disp: 90 tablet, Rfl: 1    Lancets (freestyle) lancets, , Disp: , Rfl:     metoprolol succinate XL (TOPROL-XL) 100 MG 24 hr tablet, Take 1 tablet by mouth 2 (Two) Times a Day., Disp: , Rfl:     Ozempic, 1 MG/DOSE, 4 MG/3ML solution pen-injector, Inject 1 mg under the skin into the appropriate area as directed 1 (One) Time Per Week., Disp: 9 mL, Rfl: 1    tamsulosin (FLOMAX) 0.4 MG capsule 24 hr capsule, Take 2 capsules by mouth Daily., Disp: 180 capsule, Rfl: 3    There are no discontinued medications.  No Known Allergies     Social History     Tobacco Use    Smoking status: Every Day     Current packs/day: 1.00     Average packs/day: 1 pack/day for 62.6 years (62.6 ttl pk-yrs)     Types: Cigarettes     Start date: 9/15/1962     Passive exposure: Current    Smokeless tobacco: Never   Vaping Use    Vaping status: Never Used   Substance Use Topics    Alcohol use: Not Currently    Drug use: Never       Family History   Problem Relation Age of Onset    Cancer Mother     Hypertension Mother     Anemia Mother     Cancer Father         Objective     /77   Pulse 72   Ht 177.8 cm (70\")   Wt 84.8 kg (187 lb)   BMI 26.83 kg/m²       Physical Exam    General Appearance:   no acute distress  Alert and oriented x3  HENT:   lips not cyanotic  Atraumatic  Neck:  No jvd   supple  Respiratory:  no respiratory " "distress  normal breath sounds  no rales  Cardiovascular:  Regular rate and rhythm  no S3, no S4   no murmur  no rub  Extremities  No cyanosis  lower extremity edema: none    Skin:   warm, dry  No rashes      Result Review :     No results found for: \"PROBNP\"  CMP          7/25/2024    16:58   CMP   Glucose 121    BUN 13    Creatinine 0.86    EGFR 89.7    Sodium 141    Potassium 4.2    Chloride 106    Calcium 8.5    Total Protein 6.6    Albumin 4.0    Globulin 2.6    Total Bilirubin 0.4    Alkaline Phosphatase 70    AST (SGOT) 25    ALT (SGPT) 27    Albumin/Globulin Ratio 1.5    BUN/Creatinine Ratio 15.1    Anion Gap 11.4      CBC w/diff          7/25/2024    16:58   CBC w/Diff   WBC 10.39    RBC 4.78    Hemoglobin 16.0    Hematocrit 47.8    .0    MCH 33.5    MCHC 33.5    RDW 13.2    Platelets 150    Neutrophil Rel % 74.6    Immature Granulocyte Rel % 0.4    Lymphocyte Rel % 15.0    Monocyte Rel % 8.1    Eosinophil Rel % 1.4    Basophil Rel % 0.5       Lab Results   Component Value Date    TSH 0.882 07/25/2024      No results found for: \"FREET4\"   No results found for: \"DDIMERQUANT\"  No results found for: \"MG\"   No results found for: \"DIGOXIN\"   No results found for: \"TROPONINT\"        Lipid Panel          7/25/2024    16:58   Lipid Panel   Total Cholesterol 103    Triglycerides 54    HDL Cholesterol 48    VLDL Cholesterol 13    LDL Cholesterol  42    LDL/HDL Ratio 0.92      No results found for: \"POCTROP\"                   Diagnoses and all orders for this visit:    1. Ischemic cardiomyopathy (Primary)  Assessment & Plan:  For with stable class II symptoms we will go ahead and repeat echocardiogram to reevaluate ejection fraction continue on Toprol 100 twice daily, Entresto 97/103 twice daily, and Jardiance 25 daily dosing    Orders:  -     Adult Transthoracic Echo Complete W/ Cont if Necessary Per Protocol; Future    2. Mixed hyperlipidemia  Assessment & Plan:  Can continue with Lipitor 40 nightly LDL goal "       3. Primary hypertension    4. CAD S/P percutaneous coronary angioplasty  Assessment & Plan:  Patient is doing well no ongoing angina continue with chronic aspirin 81 mg daily  And Plavix 75 strongly encouraged on smoking cessation              Follow Up     Return in about 6 months (around 10/21/2025) for EKG with F/U.          Patient was given instructions and counseling regarding his condition or for health maintenance advice. Please see specific information pulled into the AVS if appropriate.

## 2025-04-21 NOTE — ASSESSMENT & PLAN NOTE
Patient is doing well no ongoing angina continue with chronic aspirin 81 mg daily  And Plavix 75 strongly encouraged on smoking cessation

## 2025-04-21 NOTE — ASSESSMENT & PLAN NOTE
For with stable class II symptoms we will go ahead and repeat echocardiogram to reevaluate ejection fraction continue on Toprol 100 twice daily, Entresto 97/103 twice daily, and Jardiance 25 daily dosing

## 2025-05-03 PROBLEM — N40.1 BENIGN PROSTATIC HYPERPLASIA WITH LOWER URINARY TRACT SYMPTOMS: Status: ACTIVE | Noted: 2025-05-03

## 2025-05-03 NOTE — PROGRESS NOTES
BPH      Patient moved from [place] a few months ago.  He did have a urologist in [place]    3/25 cystoscopy - 4 cm prostate, large bladder with moderate trabeculation.  No pathology    Main bother is urge incontinence/ wearing pads  Cont Flomax 0.8 and  finasteride 5  Nocturia x 1  Urgency/weak stream/frequency    Some issues with vertigo      Occasional eretion      3/25   Qmax  14  VV  327    PVR    11/24    070      2018 scrotal abscess with sepsis.  Required surgery/hyperbaric oxygen and wound care    History of oxybutynin - dry mouth    CAD with prior stenting/ischemic cardiomyopathy EF 35 to 40%  COPD  DM  On Ozempic    1 pack/day for 60 years    Aspirin 81/Plavix          4/24 [facility]rology of [facility]    PVR 4/24 20    Symptoms worse with tamsulosin twice daily, better with oxybutynin    1/24 dilation of distal stricture and cystoscopy -negative cystoscopy  12/23 CT with and without - negative  12/23 GFR 72    11/19 dilation of distal meatal stricture, cystoscopy with bilateral retrogrades, bladder biopsy prostatic urethral biopsy - for atypical urine cytology and gross hematuria - meatal stricture, normal retrograde pyelograms, mild bladder erythema, mild erythema of the prostatic urethra -negative  2/18 TRUS biopsy - 66 g prostate - neg    History of atypical cytology      PSA - finasteride    4/24    2.3  12/23 4.2  2/23   2.7   5/20   4.7  9/19    4.5  9/18    2.47                BPH      Records reviewed from previous urologist    Continue Flomax 0.8 and finasteride 5    Patient is on Plavix and does have some heart history, I did discuss with him today I would feel more comfortable doing a TURP then Aquablation secondary to bleeding risk after surgery.    Patient this time is still undecided about surgery which is okay we will see him back in 6 months and if he has any more trouble with urination or gets worse he can let me know    Risks and benefits were discussed including  bleeding, infection and damage to the urinary system.  We also discussed the risk of anesthesia up to and including death.            Prostate CA screening    Patient is worried about prostate cancer we did discuss have reviewed records of his previous urologist and there is no worry for this right now.  We did discuss national cancer guidelines at this time he would like to continue to monitor this.  We will go ahead Lan to check a PSA in 6 months        35 minutes used in reviewing records /counseling      PVR follow-up

## 2025-05-05 ENCOUNTER — TELEPHONE (OUTPATIENT)
Dept: UROLOGY | Facility: CLINIC | Age: 80
End: 2025-05-05
Payer: MEDICARE

## 2025-05-05 NOTE — TELEPHONE ENCOUNTER
Procedure: Transurethral Resection of the Prostate     Med Directive: Plavix    PMH: Ischemic CM, CAD prior stent, HTN, HLD, PAD    Last Seen: 4/21/2025

## 2025-05-06 ENCOUNTER — PROCEDURE VISIT (OUTPATIENT)
Dept: UROLOGY | Age: 80
End: 2025-05-06
Payer: MEDICARE

## 2025-05-06 VITALS — BODY MASS INDEX: 26.83 KG/M2 | WEIGHT: 187 LBS

## 2025-05-06 DIAGNOSIS — N40.1 BENIGN PROSTATIC HYPERPLASIA WITH LOWER URINARY TRACT SYMPTOMS, SYMPTOM DETAILS UNSPECIFIED: Primary | ICD-10-CM

## 2025-05-13 ENCOUNTER — HOSPITAL ENCOUNTER (OUTPATIENT)
Facility: HOSPITAL | Age: 80
Discharge: HOME | End: 2025-05-13
Admitting: INTERNAL MEDICINE
Payer: MEDICARE

## 2025-05-13 DIAGNOSIS — I25.5 ISCHEMIC CARDIOMYOPATHY: ICD-10-CM

## 2025-05-13 PROCEDURE — 93306 TTE W/DOPPLER COMPLETE: CPT

## 2025-05-16 ENCOUNTER — RESULTS FOLLOW-UP (OUTPATIENT)
Dept: CARDIOLOGY | Facility: CLINIC | Age: 80
End: 2025-05-16
Payer: MEDICARE

## 2025-05-16 LAB
AORTIC DIMENSIONLESS INDEX: 0.79 (DI)
ASCENDING AORTA: 3.2 CM
AV MEAN PRESS GRAD SYS DOP V1V2: 4 MMHG
AV VMAX SYS DOP: 138 CM/SEC
BH CV ECHO MEAS - ACS: 1.7 CM
BH CV ECHO MEAS - AO MAX PG: 7.6 MMHG
BH CV ECHO MEAS - AO ROOT DIAM: 3.4 CM
BH CV ECHO MEAS - AO V2 VTI: 27.4 CM
BH CV ECHO MEAS - AVA(I,D): 2.7 CM2
BH CV ECHO MEAS - EDV(CUBED): 79.5 ML
BH CV ECHO MEAS - EDV(MOD-SP2): 75 ML
BH CV ECHO MEAS - EDV(MOD-SP4): 98.8 ML
BH CV ECHO MEAS - EF(MOD-SP2): 50.3 %
BH CV ECHO MEAS - EF(MOD-SP4): 50.2 %
BH CV ECHO MEAS - ESV(CUBED): 27 ML
BH CV ECHO MEAS - ESV(MOD-SP2): 37.3 ML
BH CV ECHO MEAS - ESV(MOD-SP4): 49.2 ML
BH CV ECHO MEAS - FS: 30.2 %
BH CV ECHO MEAS - IVS/LVPW: 1.17 CM
BH CV ECHO MEAS - IVSD: 1.4 CM
BH CV ECHO MEAS - LA DIMENSION: 3.3 CM
BH CV ECHO MEAS - LAT PEAK E' VEL: 9.7 CM/SEC
BH CV ECHO MEAS - LV DIASTOLIC VOL/BSA (35-75): 48.7 CM2
BH CV ECHO MEAS - LV MASS(C)D: 207.8 GRAMS
BH CV ECHO MEAS - LV MAX PG: 4.4 MMHG
BH CV ECHO MEAS - LV MEAN PG: 2 MMHG
BH CV ECHO MEAS - LV SYSTOLIC VOL/BSA (12-30): 24.3 CM2
BH CV ECHO MEAS - LV V1 MAX: 105 CM/SEC
BH CV ECHO MEAS - LV V1 VTI: 21.6 CM
BH CV ECHO MEAS - LVIDD: 4.3 CM
BH CV ECHO MEAS - LVIDS: 3 CM
BH CV ECHO MEAS - LVOT AREA: 3.5 CM2
BH CV ECHO MEAS - LVOT DIAM: 2.1 CM
BH CV ECHO MEAS - LVPWD: 1.2 CM
BH CV ECHO MEAS - MED PEAK E' VEL: 5 CM/SEC
BH CV ECHO MEAS - MV A MAX VEL: 72.3 CM/SEC
BH CV ECHO MEAS - MV DEC TIME: 0.21 SEC
BH CV ECHO MEAS - MV E MAX VEL: 60.3 CM/SEC
BH CV ECHO MEAS - MV E/A: 0.83
BH CV ECHO MEAS - MV MEAN PG: 3 MMHG
BH CV ECHO MEAS - MV V2 VTI: 29.1 CM
BH CV ECHO MEAS - MVA(VTI): 2.6 CM2
BH CV ECHO MEAS - RAP SYSTOLE: 5 MMHG
BH CV ECHO MEAS - RVDD: 3 CM
BH CV ECHO MEAS - RVSP: 33.1 MMHG
BH CV ECHO MEAS - SV(LVOT): 74.8 ML
BH CV ECHO MEAS - SV(MOD-SP2): 37.7 ML
BH CV ECHO MEAS - SV(MOD-SP4): 49.6 ML
BH CV ECHO MEAS - SVI(LVOT): 36.9 ML/M2
BH CV ECHO MEAS - SVI(MOD-SP2): 18.6 ML/M2
BH CV ECHO MEAS - SVI(MOD-SP4): 24.5 ML/M2
BH CV ECHO MEAS - TAPSE (>1.6): 1.78 CM
BH CV ECHO MEAS - TR MAX PG: 28.1 MMHG
BH CV ECHO MEAS - TR MAX VEL: 265 CM/SEC
BH CV ECHO MEASUREMENTS AVERAGE E/E' RATIO: 8.2
BH CV XLRA - TDI S': 14.8 CM/SEC
IVRT: 63 MS
LEFT ATRIUM VOLUME INDEX: 18.3 ML/M2
LV EF 2D ECHO EST: 55 %
LV EF BIPLANE MOD: 50.2 %

## 2025-06-19 NOTE — PROGRESS NOTES
Chief Complaint  Diabetes    Referred By: Tino Roach MD    Subjective          Patient or patient representative verbalized consent for the use of Ambient Listening during the visit with  JENNI Villegas for chart documentation. 6/23/2025  13:26 EDT    Josemanuel De La Fuente presents to [facility] for diabetes medication management    History of Present Illness    History of Present Illness  The patient presents for evaluation of diabetes mellitus and vitamin B12 deficiency.    He reports a blood glucose level of 118 this morning. He has been utilizing the Faustina 3 sensor for monitoring but did not bring it to today's appointment. He has reverted to using fingerstick glucose monitoring. He mentions some inconsistency in his medication regimen over the past few days due to a general feeling of unwellness. He expresses a desire to reduce his A1c levels to the high 5s, noting that they have been stable at 6.2 for the past few months.    He is currently seeking a new dentist and has dentures at home. He is considering dental implants but is hesitant due to negative feedback he has received about them.    He is requesting a refill of his B12 syringes, which he administers using standard insulin syringes. He has demonstrated the injection technique to his daughter.    The patient has found another neurologist, Dr. Josemanuel Caballero, who has set him up for an MRI on 07/24/2025 and a sleep study. He thinks the neurologist is leaning towards sleep apnea, but he has never had a problem with that. He sleeps with his head up and normally sleeps on his side. He is experiencing issues with memory.      Visit type:  follow-up  Diabetes type:  Type 2  Current diabetes status/concerns/issues:  No concerns  Other health concerns: Has established care with a neurologist at [facility]  Current Diabetes symptoms:    Polyuria: Yes     Polydipsia: Yes     Polyphagia: No   Blurred vision: Yes previous  cataract surgery bilateral   Excessive fatigue: No  Known Diabetes complications:  Neuropathy: Numbness; Location: Feet, Hands, and Bilateral  Renal: Stage II mild (GFR = 60-89 mL/min) and Microalbuminuria - NEGATIVE  Eyes: No Retinopathy reported on current eye exam and Without Macular Edema; Location: Bilateral  Amputation/Wounds: Amputation of right third toe  GI: Reflux and Indigestion  Cardiovascular: Hypertension and CAD  ED: Patient Reported  Other: None  Hypoglycemia:  None reported at this time  Hypoglycemia Symptoms:  No hypoglycemia at this time  Current diabetes treatment:  Jardiance 25 mg daily, Tresiba 25 units daily, Ozempic 1 mg weekly  Blood glucose device:  Meter  Blood glucose monitoring frequency:  1 - 2  Blood glucose range/average:  105-220 mg/dL  Glucose Source: Device Reviewed  Diet:  Limits high carb/sweet foods, Avoids sugary drinks, Number of meals each day - 1-2; Number of snacks each day - occasional  Activity/Exercise:  As active as physically possible    Past Medical History:   Diagnosis Date    Chronic osteomyelitis     RIGHT FOOT    COPD (chronic obstructive pulmonary disease)     Coronary artery disease 2018/2019    REPORTS HAS APPT WITH DR SUGGS 10/2024. REPORTS [facility] [place] DR YURIY TRUJILLO CP/SOA. DECREASED ACTIVITY D/T RIGHT 3 RD TOE    Diabetes mellitus 2016/2018    [facility]/Dr Chas Min/Sadia [place]spinosa[facility]    Foot ulcer     GERD (gastroesophageal reflux disease)     Hyperlipidemia 2019    Hypertension 2000    Low back pain     PAD (peripheral artery disease)     DCB LEFT LEFT MID [facility]  AND [facility] 10/20/21    Pneumonia     Seizures     LAST SEIZURE WAS OVER 50-60 YEARS AGO    Type 2 diabetes mellitus 2017    Urinary incontinence 2020     Past Surgical History:   Procedure Laterality Date    AMPUTATION DIGIT Right 09/03/2024    Procedure: AMPUTATION DIGIT RIGHT 3RD TOE;  Surgeon: Alton Fox DPM;  Location: [facility] MAIN OR;   Service: Podiatry;  Laterality: Right;    CYSTOSCOPY  2017    Dr Josemanuel Mccurdy [facility]    ELBOW PROCEDURE Left     EYE SURGERY Bilateral     KNEE SURGERY Bilateral     ARTHROSCOPY    TONSILLECTOMY       Family History   Problem Relation Age of Onset    Cancer Mother     Hypertension Mother     Anemia Mother     Cancer Father      Social History     Socioeconomic History    Marital status:    Tobacco Use    Smoking status: Every Day     Current packs/day: 1.00     Average packs/day: 1 pack/day for 62.8 years (62.8 ttl pk-yrs)     Types: Cigarettes     Start date: 9/15/1962     Passive exposure: Current    Smokeless tobacco: Never   Vaping Use    Vaping status: Never Used   Substance and Sexual Activity    Alcohol use: Not Currently    Drug use: Never    Sexual activity: Defer     No Known Allergies    Current Outpatient Medications:     albuterol sulfate  (90 Base) MCG/ACT inhaler, Inhale 2 puffs Every 4 (Four) Hours As Needed for Wheezing or Shortness of Air., Disp: , Rfl:     ALPHA LIPOIC ACID PO, Take 1 capsule by mouth Daily., Disp: , Rfl:     aspirin 81 MG EC tablet, Take 1 tablet by mouth Every Night., Disp: , Rfl:     atorvastatin (LIPITOR) 80 MG tablet, Take 1 tablet by mouth Every Night., Disp: 90 tablet, Rfl: 3    BD Pen Needle Nubia U/F 32G X 4 MM misc, , Disp: , Rfl:     buPROPion XL (WELLBUTRIN XL) 300 MG 24 hr tablet, Take 1 tablet by mouth Every Morning., Disp: , Rfl:     clopidogrel (PLAVIX) 75 MG tablet, Take 1 tablet by mouth Daily., Disp: , Rfl:     cyanocobalamin 1000 MCG/ML injection, 1 mL Every 28 (Twenty-Eight) Days., Disp: , Rfl:     ELDERBERRY PO, Take  by mouth., Disp: , Rfl:     Entresto  MG tablet, Take 1 tablet by mouth 2 (Two) Times a Day for 180 days., Disp: 180 tablet, Rfl: 1    esomeprazole (nexIUM) 40 MG packet, Take 40 mg by mouth Every Morning Before Breakfast for 180 days., Disp: 90 each, Rfl: 1    finasteride (PROSCAR) 5 MG tablet, Take 1 tablet by  "mouth Daily., Disp: 90 tablet, Rfl: 3    Fluticasone-Umeclidin-Vilant (Trelegy Ellipta) 100-62.5-25 MCG/ACT inhaler, Inhale 1 puff Daily for 90 days., Disp: 90 each, Rfl: 1    FREESTYLE LITE test strip, , Disp: , Rfl:     insulin degludec (Tresiba FlexTouch) 100 UNIT/ML solution pen-injector injection, Inject 25 Units under the skin into the appropriate area as directed Daily., Disp: 30 mL, Rfl: 1    Jardiance 25 MG tablet tablet, Take 1 tablet by mouth Daily., Disp: 90 tablet, Rfl: 1    Lancets (freestyle) lancets, , Disp: , Rfl:     metoprolol succinate XL (TOPROL-XL) 100 MG 24 hr tablet, Take 1 tablet by mouth 2 (Two) Times a Day., Disp: , Rfl:     Ozempic, 1 MG/DOSE, 4 MG/3ML solution pen-injector, Inject 1 mg under the skin into the appropriate area as directed 1 (One) Time Per Week., Disp: 9 mL, Rfl: 1    tamsulosin (FLOMAX) 0.4 MG capsule 24 hr capsule, Take 2 capsules by mouth Daily., Disp: 180 capsule, Rfl: 3    Objective     Vitals:    06/23/25 1309   BP: 108/52   BP Location: Left arm   Patient Position: Sitting   Cuff Size: Adult   Pulse: 78   SpO2: 96%   Weight: 87.1 kg (192 lb)   Height: 177.8 cm (70\")     Body mass index is 27.55 kg/m².    Physical Exam  Constitutional:       Appearance: Normal appearance.      Comments: Overweight (BMI 25 - 29.9) Pt Current BMI = 27.55      HENT:      Head: Normocephalic and atraumatic.      Right Ear: External ear normal.      Left Ear: External ear normal.      Nose: Nose normal.   Eyes:      Extraocular Movements: Extraocular movements intact.      Conjunctiva/sclera: Conjunctivae normal.   Pulmonary:      Effort: Pulmonary effort is normal.   Musculoskeletal:         General: Normal range of motion.      Cervical back: Normal range of motion.   Skin:     General: Skin is warm and dry.   Neurological:      General: No focal deficit present.      Mental Status: He is alert and oriented to person, place, and time. Mental status is at baseline.   Psychiatric:         " Mood and Affect: Mood normal.         Behavior: Behavior normal.         Thought Content: Thought content normal.         Judgment: Judgment normal.         Result Review :   The following data was reviewed by: JENNI Villegas on 06/23/2025:    Most Recent A1C          6/23/2025    13:15   HGBA1C Most Recent   Hemoglobin A1C 6.2        A1C Last 3 Results          2/6/2025    14:51 3/24/2025    14:04 6/23/2025    13:15   HGBA1C Last 3 Results   Hemoglobin A1C 6.20  6.2  6.2      A1c collected in the office today is 6.2%, indicating Controlled Type II diabetes.  This result is unchanged from the prior result of 6.2% collected on 3/24/2025    Glucose   Date Value Ref Range Status   06/23/2025 140 (H) 70 - 99 mg/dL Final     Comment:     Serial Number: [id number]:  [id number]     Point of care glucose in the office today is within normal limits for nonfasting glucose    Creatinine   Date Value Ref Range Status   07/25/2024 0.86 0.76 - 1.27 mg/dL Final     eGFR   Date Value Ref Range Status   07/25/2024 89.7 >60.0 mL/min/1.73 Final     Labs collected on 7/25/2024 show Stage II mild (GFR = 60-89mL/min)    Microalbumin, Urine   Date Value Ref Range Status   02/06/2025 1.8 mg/dL Final   07/25/2024 <1.2 mg/dL Final     Creatinine, Urine   Date Value Ref Range Status   02/06/2025 67.7 mg/dL Final   07/25/2024 87.4 mg/dL Final     Microalbumin/Creatinine Ratio   Date Value Ref Range Status   02/06/2025 26.6 0.0 - 29.0 mg/g Final   07/25/2024   Final     Comment:     Unable to calculate     Urine microalbuminuria collected on 2/6/2025 is negative for microalbuminuria    Total Cholesterol   Date Value Ref Range Status   07/25/2024 103 0 - 200 mg/dL Final     Triglycerides   Date Value Ref Range Status   07/25/2024 54 0 - 150 mg/dL Final     HDL Cholesterol   Date Value Ref Range Status   07/25/2024 48 40 - 60 mg/dL Final     LDL Cholesterol    Date Value Ref Range Status   07/25/2024 42 0 - 100 mg/dL Final     Lipid  panel collected on 7/25/2020 shows normal lipid panel            Diagnoses and all orders for this visit:    1. Controlled type 2 diabetes mellitus with hyperglycemia, with long-term current use of insulin (Primary)  -     POC Glycosylated Hemoglobin (Hb A1C)  -     POC Glucose    2. Controlled type 2 diabetes mellitus with stage 2 chronic kidney disease, with long-term current use of insulin    3. Controlled type 2 diabetes mellitus with diabetic polyneuropathy, with long-term current use of insulin    4. Hemoglobin A1c less than 7.0%    5. Overweight with body mass index (BMI) of 26 to 26.9 in adult        Assessment & Plan  1. Diabetes Mellitus: stable  - A1c levels have remained stable at 6.2 since 02/06/2025.  - Finger stick readings show a 7-day average of 119 mg/dL, 14-day average of 120 mg/dL, and 30-day average of 128 mg/dL.  - Discussion on calibrating Dexcom device using phone porter if needed; patient prefers finger sticks.  - Prescription refills verified and sufficient for the next 3 months.    2. Vitamin B12 Deficiency  - Using regular insulin syringes for B12 injections.  - Physical exam findings include adequate administration technique.  - Discussion on ensuring proper technique for B12 injections; patient teaching provided.  - Prescription for B12 syringes sent to pharmacy.    3. Dental issues  - Currently seeking a new dentist; has dentures at home.  - No physical exam findings discussed.  - Considering dental implants but hesitant due to negative feedback.  - Plan to find a dentist to ensure proper fit of dentures or explore other options.    Follow-up: The patient will follow up in 3 months.    The patient will monitor his blood glucose levels  1 - 2 times daily.  If he develops problematic hyperglycemia or hypoglycemia or adverse drug reactions, he will contact the office for further instructions.        Follow Up     Return in about 3 months (around 9/23/2025) for CGM Follow-Up, Medication  Management.    Patient was given instructions and counseling regarding his condition or for health maintenance advice. Please see specific information pulled into the AVS if appropriate.     Klever Hubbard, APRN  06/23/2025    Dictated Utilizing Dragon Dictation.  Please note that portions of this note were completed with a voice recognition program.  Part of this note may be an electronic transcription/translation of spoken language to printed text using the Dragon Dictation System.

## 2025-06-23 ENCOUNTER — OFFICE VISIT (OUTPATIENT)
Dept: DIABETES SERVICES | Facility: HOSPITAL | Age: 80
End: 2025-06-23
Payer: MEDICARE

## 2025-06-23 VITALS
WEIGHT: 192 LBS | BODY MASS INDEX: 27.49 KG/M2 | SYSTOLIC BLOOD PRESSURE: 108 MMHG | HEART RATE: 78 BPM | OXYGEN SATURATION: 96 % | DIASTOLIC BLOOD PRESSURE: 52 MMHG | HEIGHT: 70 IN

## 2025-06-23 DIAGNOSIS — E11.42 CONTROLLED TYPE 2 DIABETES MELLITUS WITH DIABETIC POLYNEUROPATHY, WITH LONG-TERM CURRENT USE OF INSULIN: ICD-10-CM

## 2025-06-23 DIAGNOSIS — E66.3 OVERWEIGHT WITH BODY MASS INDEX (BMI) OF 26 TO 26.9 IN ADULT: ICD-10-CM

## 2025-06-23 DIAGNOSIS — R73.09 HEMOGLOBIN A1C LESS THAN 7.0%: ICD-10-CM

## 2025-06-23 DIAGNOSIS — Z79.4 CONTROLLED TYPE 2 DIABETES MELLITUS WITH HYPERGLYCEMIA, WITH LONG-TERM CURRENT USE OF INSULIN: Primary | ICD-10-CM

## 2025-06-23 DIAGNOSIS — Z79.4 CONTROLLED TYPE 2 DIABETES MELLITUS WITH STAGE 2 CHRONIC KIDNEY DISEASE, WITH LONG-TERM CURRENT USE OF INSULIN: ICD-10-CM

## 2025-06-23 DIAGNOSIS — Z79.4 CONTROLLED TYPE 2 DIABETES MELLITUS WITH DIABETIC POLYNEUROPATHY, WITH LONG-TERM CURRENT USE OF INSULIN: ICD-10-CM

## 2025-06-23 DIAGNOSIS — E11.65 CONTROLLED TYPE 2 DIABETES MELLITUS WITH HYPERGLYCEMIA, WITH LONG-TERM CURRENT USE OF INSULIN: Primary | ICD-10-CM

## 2025-06-23 DIAGNOSIS — E11.22 CONTROLLED TYPE 2 DIABETES MELLITUS WITH STAGE 2 CHRONIC KIDNEY DISEASE, WITH LONG-TERM CURRENT USE OF INSULIN: ICD-10-CM

## 2025-06-23 DIAGNOSIS — N18.2 CONTROLLED TYPE 2 DIABETES MELLITUS WITH STAGE 2 CHRONIC KIDNEY DISEASE, WITH LONG-TERM CURRENT USE OF INSULIN: ICD-10-CM

## 2025-06-23 LAB
EXPIRATION DATE: ABNORMAL
GLUCOSE BLDC GLUCOMTR-MCNC: 140 MG/DL (ref 70–99)
HBA1C MFR BLD: 6.2 % (ref 4.5–5.7)
Lab: ABNORMAL

## 2025-06-23 PROCEDURE — 1160F RVW MEDS BY RX/DR IN RCRD: CPT

## 2025-06-23 PROCEDURE — 82948 REAGENT STRIP/BLOOD GLUCOSE: CPT

## 2025-06-23 PROCEDURE — 1159F MED LIST DOCD IN RCRD: CPT

## 2025-06-23 PROCEDURE — 3078F DIAST BP <80 MM HG: CPT

## 2025-06-23 PROCEDURE — 83036 HEMOGLOBIN GLYCOSYLATED A1C: CPT

## 2025-06-23 PROCEDURE — G0463 HOSPITAL OUTPT CLINIC VISIT: HCPCS

## 2025-06-23 PROCEDURE — G2211 COMPLEX E/M VISIT ADD ON: HCPCS

## 2025-06-23 PROCEDURE — 3074F SYST BP LT 130 MM HG: CPT

## 2025-06-23 PROCEDURE — 99214 OFFICE O/P EST MOD 30 MIN: CPT

## 2025-06-23 RX ORDER — CALCIUM CARB/VITAMIN D3/VIT K1 500-100-40
1 TABLET,CHEWABLE ORAL DAILY
Qty: 100 EACH | Refills: 1 | Status: SHIPPED | OUTPATIENT
Start: 2025-06-23

## 2025-06-30 ENCOUNTER — OFFICE VISIT (OUTPATIENT)
Dept: PODIATRY | Facility: CLINIC | Age: 80
End: 2025-06-30
Payer: MEDICARE

## 2025-06-30 VITALS
WEIGHT: 184 LBS | DIASTOLIC BLOOD PRESSURE: 77 MMHG | SYSTOLIC BLOOD PRESSURE: 134 MMHG | HEART RATE: 77 BPM | TEMPERATURE: 98 F | OXYGEN SATURATION: 97 % | BODY MASS INDEX: 26.4 KG/M2

## 2025-06-30 DIAGNOSIS — B35.1 ONYCHOMYCOSIS: ICD-10-CM

## 2025-06-30 DIAGNOSIS — E08.59 DIABETES MELLITUS DUE TO UNDERLYING CONDITION WITH OTHER CIRCULATORY COMPLICATIONS: Primary | ICD-10-CM

## 2025-07-01 NOTE — PROGRESS NOTES
Psychiatric - PODIATRY    Today's Date: 06/30/25    Patient Name: Josemanuel De La Fuente  MRN: [id number]  CSN: [id number]  PCP: Tino Roach MD,   Referring Provider: No ref. provider found    SUBJECTIVE     Chief Complaint   Patient presents with    Left Foot - Follow-up, Nail Problem    Right Foot - Follow-up, Nail Problem     HPI: Josemanuel De La Fuente, a 79 y.o.male, presents to clinic.    Patient is here for follow-up.  States last recent blood sugar was >100.  He is getting evaluated due to lack of balance. MRI of brain scheduled.     Past Medical History:   Diagnosis Date    Chronic osteomyelitis     RIGHT FOOT    COPD (chronic obstructive pulmonary disease)     Coronary artery disease 2018/2019    REPORTS HAS APPT WITH DR SUGGS 10/2024. REPORTS HAD SEEN CARDIOLOGY [facility] IN [place] DR YURIY TRUJILLO CP/SOA. DECREASED ACTIVITY D/T RIGHT 3 RD TOE    Diabetes mellitus 2016/2018    [facility]/Dr Chas Min/Sadia Espinosa[place]    Foot ulcer     GERD (gastroesophageal reflux disease)     Hyperlipidemia 2019    Hypertension 2000    Low back pain     PAD (peripheral artery disease)     DCB LEFT LEFT MID [facility]  AND [facility] 10/20/21    Pneumonia     Seizures     LAST SEIZURE WAS OVER 50-60 YEARS AGO    Type 2 diabetes mellitus 2017    Urinary incontinence 2020     Past Surgical History:   Procedure Laterality Date    AMPUTATION DIGIT Right 09/03/2024    Procedure: AMPUTATION DIGIT RIGHT 3RD TOE;  Surgeon: Alton Fox DPM;  Location: [facility];  Service: Podiatry;  Laterality: Right;    CYSTOSCOPY  2017    Dr Josemanuel Mccurdy [facility]    ELBOW PROCEDURE Left     EYE SURGERY Bilateral     KNEE SURGERY Bilateral     ARTHROSCOPY    TONSILLECTOMY       Family History   Problem Relation Age of Onset    Cancer Mother     Hypertension Mother     Anemia Mother     Cancer Father      Social History     Socioeconomic History    Marital status:    Tobacco Use     "Smoking status: Every Day     Current packs/day: 1.00     Average packs/day: 1 pack/day for 62.8 years (62.8 ttl pk-yrs)     Types: Cigarettes     Start date: 9/15/1962     Passive exposure: Current    Smokeless tobacco: Never   Vaping Use    Vaping status: Never Used   Substance and Sexual Activity    Alcohol use: Not Currently    Drug use: Never    Sexual activity: Defer     No Known Allergies  Current Outpatient Medications   Medication Sig Dispense Refill    albuterol sulfate  (90 Base) MCG/ACT inhaler Inhale 2 puffs Every 4 (Four) Hours As Needed for Wheezing or Shortness of Air.      ALPHA LIPOIC ACID PO Take 1 capsule by mouth Daily.      aspirin 81 MG EC tablet Take 1 tablet by mouth Every Night.      atorvastatin (LIPITOR) 80 MG tablet Take 1 tablet by mouth Every Night. 90 tablet 3    BD Pen Needle Nubia U/F 32G X 4 MM misc       buPROPion XL (WELLBUTRIN XL) 300 MG 24 hr tablet Take 1 tablet by mouth Every Morning.      clopidogrel (PLAVIX) 75 MG tablet Take 1 tablet by mouth Daily.      cyanocobalamin 1000 MCG/ML injection 1 mL Every 28 (Twenty-Eight) Days.      ELDERBERRY PO Take  by mouth.      Entresto  MG tablet Take 1 tablet by mouth 2 (Two) Times a Day for 180 days. 180 tablet 1    esomeprazole (nexIUM) 40 MG packet Take 40 mg by mouth Every Morning Before Breakfast for 180 days. 90 each 1    finasteride (PROSCAR) 5 MG tablet Take 1 tablet by mouth Daily. 90 tablet 3    Fluticasone-Umeclidin-Vilant (Trelegy Ellipta) 100-62.5-25 MCG/ACT inhaler Inhale 1 puff Daily for 90 days. 90 each 1    FREESTYLE LITE test strip       insulin degludec (Tresiba FlexTouch) 100 UNIT/ML solution pen-injector injection Inject 25 Units under the skin into the appropriate area as directed Daily. 30 mL 1    Insulin Syringe 31G X 5/16\" 1 ML misc Use 1 each Daily. 100 each 1    Jardiance 25 MG tablet tablet Take 1 tablet by mouth Daily. 90 tablet 1    Lancets (freestyle) lancets       metoprolol succinate XL " (TOPROL-XL) 100 MG 24 hr tablet Take 1 tablet by mouth 2 (Two) Times a Day.      Ozempic, 1 MG/DOSE, 4 MG/3ML solution pen-injector Inject 1 mg under the skin into the appropriate area as directed 1 (One) Time Per Week. 9 mL 1    tamsulosin (FLOMAX) 0.4 MG capsule 24 hr capsule Take 2 capsules by mouth Daily. 180 capsule 3     No current facility-administered medications for this visit.     Review of Systems   Constitutional: Negative.    Skin:         Painful toenails   Neurological:  Positive for numbness.   All other systems reviewed and are negative.      OBJECTIVE     Vitals:    06/30/25 1338   BP: 134/77   Pulse: 77   Temp: 98 °F (36.7 °C)   SpO2: 97%       WBC   Date Value Ref Range Status   07/25/2024 10.39 3.40 - 10.80 10*3/mm3 Final   08/06/2020 8.60 3.40 - 10.80 10*3/µL Final     RBC   Date Value Ref Range Status   07/25/2024 4.78 4.14 - 5.80 10*6/mm3 Final   08/06/2020 4.63 4.30 - 6.10 10*6/µL Final     Hemoglobin   Date Value Ref Range Status   07/25/2024 16.0 13.0 - 17.7 g/dL Final   08/06/2020 15.5 14.0 - 18.0 g/dL Final     Hematocrit   Date Value Ref Range Status   07/25/2024 47.8 37.5 - 51.0 % Final   08/06/2020 45.2 38.0 - 49.0 % Final     MCV   Date Value Ref Range Status   07/25/2024 100.0 (H) 79.0 - 97.0 fL Final   08/06/2020 97.6 (H) 80.0 - 96.0 fL Final     MCH   Date Value Ref Range Status   07/25/2024 33.5 (H) 26.6 - 33.0 pg Final   08/06/2020 33.4 26.0 - 34.0 pg Final     MCHC   Date Value Ref Range Status   07/25/2024 33.5 31.5 - 35.7 g/dL Final   08/06/2020 34.2 32.0 - 36.0 g/dL Final     RDW   Date Value Ref Range Status   07/25/2024 13.2 12.3 - 15.4 % Final   08/06/2020 13.9 11.5 - 15.5 % Final     RDW-SD   Date Value Ref Range Status   07/25/2024 48.1 37.0 - 54.0 fl Final     MPV   Date Value Ref Range Status   07/25/2024 10.9 6.0 - 12.0 fL Final   08/06/2020 9.3 6.0 - 9.5 fL Final     Platelets   Date Value Ref Range Status   07/25/2024 150 140 - 450 10*3/mm3 Final   08/06/2020 127  (L) 150 - 440 10*3/µL Final     Neutrophil %   Date Value Ref Range Status   07/25/2024 74.6 42.7 - 76.0 % Final     Lymphocyte %   Date Value Ref Range Status   07/25/2024 15.0 (L) 19.6 - 45.3 % Final     Monocyte %   Date Value Ref Range Status   07/25/2024 8.1 5.0 - 12.0 % Final     Eosinophil %   Date Value Ref Range Status   07/25/2024 1.4 0.3 - 6.2 % Final     Basophil %   Date Value Ref Range Status   07/25/2024 0.5 0.0 - 1.5 % Final     Immature Grans %   Date Value Ref Range Status   07/25/2024 0.4 0.0 - 0.5 % Final     Neutrophils, Absolute   Date Value Ref Range Status   07/25/2024 7.75 (H) 1.70 - 7.00 10*3/mm3 Final     Lymphocytes, Absolute   Date Value Ref Range Status   07/25/2024 1.56 0.70 - 3.10 10*3/mm3 Final     Monocytes, Absolute   Date Value Ref Range Status   07/25/2024 0.84 0.10 - 0.90 10*3/mm3 Final     Eosinophils, Absolute   Date Value Ref Range Status   07/25/2024 0.15 0.00 - 0.40 10*3/mm3 Final     Basophils, Absolute   Date Value Ref Range Status   07/25/2024 0.05 0.00 - 0.20 10*3/mm3 Final     Immature Grans, Absolute   Date Value Ref Range Status   07/25/2024 0.04 0.00 - 0.05 10*3/mm3 Final     nRBC   Date Value Ref Range Status   07/25/2024 0.0 0.0 - 0.2 /100 WBC Final         Lab Results   Component Value Date    GLUCOSE 121 (H) 07/25/2024    BUN 13 07/25/2024    CREATININE 0.86 07/25/2024    BCR 15.1 07/25/2024    K 4.2 07/25/2024    CO2 23.6 07/25/2024    CALCIUM 8.5 (L) 07/25/2024    ALBUMIN 4.0 07/25/2024    AST 25 07/25/2024    ALT 27 07/25/2024       Patient seen in no apparent distress.      PHYSICAL EXAM:     Foot/Ankle Exam    GENERAL  Appearance:  appears stated age  Orientation:  AAOx3  Affect:  appropriate  Gait:  unimpaired  Assistance:  independent  Right shoe gear: casual shoe  Left shoe gear: casual shoe    VASCULAR     Right Foot Vascularity   Normal vascular exam    Dorsalis pedis:  2+  Posterior tibial:  2+  Skin temperature:  warm  Edema grading:  None  CFT:  < 3  seconds  Pedal hair growth:  Present  Varicosities:  none     Left Foot Vascularity   Normal vascular exam    Dorsalis pedis:  2+  Posterior tibial:  2+  Skin temperature:  warm  Edema grading:  None  CFT:  < 3 seconds  Pedal hair growth:  Present  Varicosities:  none     NEUROLOGIC     Right Foot Neurologic   Normal sensation    Light touch sensation: normal  Vibratory sensation: normal  Hot/Cold sensation: normal  Protective Sensation using [employer] Monofilament:   Sites intact: 10  Sites tested: 10     Left Foot Neurologic   Normal sensation    Light touch sensation: normal  Vibratory sensation: normal  Hot/Cold sensation:  normal  Protective Sensation using [place] Monofilament:   Sites intact: 10  Sites tested: 10    MUSCULOSKELETAL     Left Foot Musculoskeletal   Hammertoe:  Second toe (Callus to first interspace.)    MUSCLE STRENGTH     Right Foot Muscle Strength   Foot dorsiflexion:  4  Foot plantar flexion:  4  Foot inversion:  4  Foot eversion:  4     Left Foot Muscle Strength   Foot dorsiflexion:  4  Foot plantar flexion:  4  Foot inversion:  4  Foot eversion:  4    RANGE OF MOTION     Right Foot Range of Motion   Foot and ankle ROM within normal limits       Left Foot Range of Motion   Foot and ankle ROM within normal limits      DERMATOLOGIC      Right Foot Dermatologic   Skin  Right foot skin is intact.   Nails  1.  Positive for elongated, onychomycosis, abnormal thickness, subungual debris and ingrown toenail.  2.  Positive for elongated, onychomycosis, abnormal thickness, subungual debris and ingrown toenail.  3.  Positive for elongated, onychomycosis, abnormal thickness, subungual debris and ingrown toenail.  4.  Positive for elongated, onychomycosis, abnormal thickness, subungual debris and ingrown toenail.  5.  Positive for elongated, onychomycosis, abnormal thickness, subungual debris and ingrown toenail.  Nails comment:  Toenails 1, 2, 3, 4, and 5     Left Foot Dermatologic    Skin  Left foot skin is intact.   Nails comment:  Toenails 1, 2, 3, 4, and 5  Nails  1.  Positive for elongated, onychomycosis, abnormal thickness, subungual debris and ingrown toenail.  2.  Positive for elongated, onychomycosis, abnormal thickness, subungual debris and ingrown toenail.  3.  Positive for elongated, onychomycosis, abnormal thickness, subungual debris and ingrown toenail.  4.  Positive for elongated, onychomycosis, abnormally thick, subungual debris and ingrown toenail.  5.  Positive for elongated, onychomycosis, abnormally thick, subungual debris and ingrown toenail.      RADIOLOGY:        No results found.    ASSESSMENT/PLAN     Diagnoses and all orders for this visit:    1. 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 Onychomycosis          Comprehensive lower extremity examination and evaluation was performed.    Toe spacer to first interspace.  Discussed using it if symptoms worsen or area becomes open.  Discussed importance of daily foot checks tight glycemic control.    Toenails 1, 2, 3, 4, 5 on Right and 1, 2, 3, 4, 5 on Left were debrided with nail nippers. Patient tolerated procedure well without complications.    Patient return to clinic in 3 months.     Discussed findings and treatment plan including risks, benefits, and treatment options with patient in detail. Patient agreed with treatment plan.    Medications and allergies reviewed.  Reviewed available lab values along with other pertinent labs.  These were discussed with the patient.    All questions were answered to patient/family satisfaction. Should symptoms fail to improve or worsen they agree to call or return to clinic or to go to the Emergency Department. Discussed the importance of following up with any needed screening tests/labs/specialist appointments and any requested follow-up recommended by me today. Importance of maintaining follow-up discussed and patient accepts  that missed appointments can delay diagnosis and potentially lead to worsening of conditions.    Return in about 3 months (around 9/30/2025)., or sooner if acute issues arise.    This document has been electronically signed by Alton Fox DPM on June 30, 2025 20:23 EDT

## 2025-08-09 ENCOUNTER — TELEPHONE (OUTPATIENT)
Dept: FAMILY MEDICINE CLINIC | Facility: CLINIC | Age: 80
End: 2025-08-09
Payer: MEDICARE

## 2025-08-13 ENCOUNTER — OFFICE VISIT (OUTPATIENT)
Dept: FAMILY MEDICINE CLINIC | Facility: CLINIC | Age: 80
End: 2025-08-13
Payer: MEDICARE

## 2025-08-13 VITALS
BODY MASS INDEX: 28.06 KG/M2 | WEIGHT: 196 LBS | HEART RATE: 83 BPM | HEIGHT: 70 IN | DIASTOLIC BLOOD PRESSURE: 68 MMHG | OXYGEN SATURATION: 92 % | TEMPERATURE: 97.4 F | SYSTOLIC BLOOD PRESSURE: 132 MMHG

## 2025-08-13 DIAGNOSIS — E78.2 MIXED HYPERLIPIDEMIA: ICD-10-CM

## 2025-08-13 DIAGNOSIS — I10 PRIMARY HYPERTENSION: ICD-10-CM

## 2025-08-13 DIAGNOSIS — E53.8 VITAMIN B12 DEFICIENCY: ICD-10-CM

## 2025-08-13 DIAGNOSIS — Z00.00 MEDICARE ANNUAL WELLNESS VISIT, SUBSEQUENT: Primary | ICD-10-CM

## 2025-08-13 DIAGNOSIS — Z79.4 TYPE 2 DIABETES MELLITUS WITH HYPERGLYCEMIA, WITH LONG-TERM CURRENT USE OF INSULIN: ICD-10-CM

## 2025-08-13 DIAGNOSIS — Z12.2 ENCOUNTER FOR SCREENING FOR LUNG CANCER: ICD-10-CM

## 2025-08-13 DIAGNOSIS — E11.9 ENCOUNTER FOR DIABETIC FOOT EXAM: ICD-10-CM

## 2025-08-13 DIAGNOSIS — Z87.891 PERSONAL HISTORY OF NICOTINE DEPENDENCE: ICD-10-CM

## 2025-08-13 DIAGNOSIS — E11.65 TYPE 2 DIABETES MELLITUS WITH HYPERGLYCEMIA, WITH LONG-TERM CURRENT USE OF INSULIN: ICD-10-CM

## 2025-08-13 LAB
CHOLEST SERPL-MCNC: 104 MG/DL (ref 0–200)
HDLC SERPL-MCNC: 49 MG/DL (ref 40–60)
LDLC SERPL CALC-MCNC: 43 MG/DL (ref 0–100)
LDLC/HDLC SERPL: 0.93 {RATIO}
TRIGL SERPL-MCNC: 47 MG/DL (ref 0–150)
VLDLC SERPL-MCNC: 12 MG/DL (ref 5–40)

## 2025-08-13 PROCEDURE — 80061 LIPID PANEL: CPT | Performed by: FAMILY MEDICINE

## 2025-08-14 ENCOUNTER — RESULTS FOLLOW-UP (OUTPATIENT)
Dept: FAMILY MEDICINE CLINIC | Facility: CLINIC | Age: 80
End: 2025-08-14
Payer: MEDICARE

## (undated) DEVICE — GLV SURG SENSICARE PI ORTHO SZ8 LF STRL

## (undated) DEVICE — GLV SURG SENSICARE PI LF PF 7.5 GRN STRL

## (undated) DEVICE — PCH INST SURG INVISISHIELD 2PCKT

## (undated) DEVICE — BNDG ESMARK 4IN 12FT LF STRL BLU

## (undated) DEVICE — STERILE POLYISOPRENE POWDER-FREE SURGICAL GLOVES WITH EMOLLIENT COATING: Brand: PROTEXIS

## (undated) DEVICE — GAUZE,SPONGE,4"X4",16PLY,STRL,LF,10/TRAY: Brand: MEDLINE

## (undated) DEVICE — BANDAGE,GAUZE,BULKEE II,4.5"X4.1YD,STRL: Brand: MEDLINE

## (undated) DEVICE — GOWN,REINFORCE,POLY,SIRUS,BREATH SLV,XLG: Brand: MEDLINE

## (undated) DEVICE — INTENDED FOR TISSUE SEPARATION, AND OTHER PROCEDURES THAT REQUIRE A SHARP SURGICAL BLADE TO PUNCTURE OR CUT.: Brand: BARD-PARKER ® CARBON RIB-BACK BLADES

## (undated) DEVICE — EXTREMITY-LF: Brand: MEDLINE INDUSTRIES, INC.

## (undated) DEVICE — PENCL ES MEGADINE EZ/CLEAN BUTN W/HOLSTR 10FT